# Patient Record
Sex: FEMALE | Race: BLACK OR AFRICAN AMERICAN
[De-identification: names, ages, dates, MRNs, and addresses within clinical notes are randomized per-mention and may not be internally consistent; named-entity substitution may affect disease eponyms.]

---

## 2017-02-15 ENCOUNTER — HOSPITAL ENCOUNTER (OUTPATIENT)
Dept: HOSPITAL 62 - OD | Age: 62
End: 2017-02-15
Payer: COMMERCIAL

## 2017-02-15 DIAGNOSIS — M79.7: ICD-10-CM

## 2017-02-15 DIAGNOSIS — I10: Primary | ICD-10-CM

## 2017-02-15 LAB
ALBUMIN SERPL-MCNC: 4.5 G/DL (ref 3.5–5)
ALP SERPL-CCNC: 98 U/L (ref 38–126)
ALT SERPL-CCNC: 18 U/L (ref 9–52)
ANION GAP SERPL CALC-SCNC: 13 MMOL/L (ref 5–19)
AST SERPL-CCNC: 18 U/L (ref 14–36)
BASOPHILS # BLD AUTO: 0 10^3/UL (ref 0–0.2)
BASOPHILS NFR BLD AUTO: 0.8 % (ref 0–2)
BILIRUB DIRECT SERPL-MCNC: 0 MG/DL (ref 0–0.3)
BILIRUB SERPL-MCNC: 0.8 MG/DL (ref 0.2–1.3)
BUN SERPL-MCNC: 14 MG/DL (ref 7–20)
CALCIUM: 9.5 MG/DL (ref 8.4–10.2)
CHLORIDE SERPL-SCNC: 106 MMOL/L (ref 98–107)
CHOLEST SERPL-MCNC: 149.58 MG/DL (ref 0–200)
CO2 SERPL-SCNC: 27 MMOL/L (ref 22–30)
CREAT SERPL-MCNC: 0.97 MG/DL (ref 0.52–1.25)
DIRECT HDL: 61 MG/DL (ref 40–?)
EOSINOPHIL # BLD AUTO: 0.3 10^3/UL (ref 0–0.6)
EOSINOPHIL NFR BLD AUTO: 5.9 % (ref 0–6)
ERYTHROCYTE [DISTWIDTH] IN BLOOD BY AUTOMATED COUNT: 17.4 % (ref 11.5–14)
ERYTHROCYTE [SEDIMENTATION RATE] IN BLOOD: 30 MM/HR (ref 0–30)
GLUCOSE SERPL-MCNC: 100 MG/DL (ref 75–110)
HCT VFR BLD CALC: 34 % (ref 36–47)
HGB BLD-MCNC: 12 G/DL (ref 12–15.5)
HGB HCT DIFFERENCE: 2
LDLC SERPL DIRECT ASSAY-MCNC: 33 MG/DL (ref ?–100)
LYMPHOCYTES # BLD AUTO: 1.3 10^3/UL (ref 0.5–4.7)
LYMPHOCYTES NFR BLD AUTO: 27.8 % (ref 13–45)
MCH RBC QN AUTO: 27.6 PG (ref 27–33.4)
MCHC RBC AUTO-ENTMCNC: 35.3 G/DL (ref 32–36)
MCV RBC AUTO: 78 FL (ref 80–97)
MONOCYTES # BLD AUTO: 0.3 10^3/UL (ref 0.1–1.4)
MONOCYTES NFR BLD AUTO: 6.7 % (ref 3–13)
NEUTROPHILS # BLD AUTO: 2.8 10^3/UL (ref 1.7–8.2)
NEUTS SEG NFR BLD AUTO: 58.8 % (ref 42–78)
POTASSIUM SERPL-SCNC: 4.1 MMOL/L (ref 3.6–5)
PROT SERPL-MCNC: 7.4 G/DL (ref 6.3–8.2)
RBC # BLD AUTO: 4.34 10^6/UL (ref 3.72–5.28)
SODIUM SERPL-SCNC: 145.8 MMOL/L (ref 137–145)
TRIGL SERPL-MCNC: 75 MG/DL (ref ?–150)
VLDLC SERPL CALC-MCNC: 15 MG/DL (ref 10–31)
WBC # BLD AUTO: 4.8 10^3/UL (ref 4–10.5)

## 2017-02-15 PROCEDURE — 80053 COMPREHEN METABOLIC PANEL: CPT

## 2017-02-15 PROCEDURE — 85652 RBC SED RATE AUTOMATED: CPT

## 2017-02-15 PROCEDURE — 86038 ANTINUCLEAR ANTIBODIES: CPT

## 2017-02-15 PROCEDURE — 86430 RHEUMATOID FACTOR TEST QUAL: CPT

## 2017-02-15 PROCEDURE — 80061 LIPID PANEL: CPT

## 2017-02-15 PROCEDURE — 84443 ASSAY THYROID STIM HORMONE: CPT

## 2017-02-15 PROCEDURE — 86200 CCP ANTIBODY: CPT

## 2017-02-15 PROCEDURE — 36415 COLL VENOUS BLD VENIPUNCTURE: CPT

## 2017-02-15 PROCEDURE — 85025 COMPLETE CBC W/AUTO DIFF WBC: CPT

## 2017-02-15 PROCEDURE — 83036 HEMOGLOBIN GLYCOSYLATED A1C: CPT

## 2017-02-17 LAB — CYCLIC CITRUL PEPTIDE IGG/A AB: 6 UNITS (ref 0–19)

## 2017-02-20 ENCOUNTER — HOSPITAL ENCOUNTER (EMERGENCY)
Dept: HOSPITAL 62 - ER | Age: 62
Discharge: HOME | End: 2017-02-20
Payer: COMMERCIAL

## 2017-02-20 VITALS — SYSTOLIC BLOOD PRESSURE: 148 MMHG | DIASTOLIC BLOOD PRESSURE: 85 MMHG

## 2017-02-20 DIAGNOSIS — R09.89: ICD-10-CM

## 2017-02-20 DIAGNOSIS — J40: Primary | ICD-10-CM

## 2017-02-20 DIAGNOSIS — R05: ICD-10-CM

## 2017-02-20 DIAGNOSIS — R06.02: ICD-10-CM

## 2017-02-20 LAB
ALBUMIN SERPL-MCNC: 4.3 G/DL (ref 3.5–5)
ALP SERPL-CCNC: 106 U/L (ref 38–126)
ALT SERPL-CCNC: 21 U/L (ref 9–52)
ANION GAP SERPL CALC-SCNC: 13 MMOL/L (ref 5–19)
APPEARANCE UR: CLEAR
AST SERPL-CCNC: 19 U/L (ref 14–36)
BASOPHILS # BLD AUTO: 0.1 10^3/UL (ref 0–0.2)
BASOPHILS NFR BLD AUTO: 1.1 % (ref 0–2)
BILIRUB DIRECT SERPL-MCNC: 0 MG/DL (ref 0–0.3)
BILIRUB SERPL-MCNC: 0.9 MG/DL (ref 0.2–1.3)
BILIRUB UR QL STRIP: NEGATIVE
BUN SERPL-MCNC: 14 MG/DL (ref 7–20)
CALCIUM: 10.1 MG/DL (ref 8.4–10.2)
CHLORIDE SERPL-SCNC: 102 MMOL/L (ref 98–107)
CK MB SERPL-MCNC: 0.37 NG/ML (ref ?–4.55)
CK SERPL-CCNC: 74 U/L (ref 30–135)
CO2 SERPL-SCNC: 31 MMOL/L (ref 22–30)
CREAT SERPL-MCNC: 1.11 MG/DL (ref 0.52–1.25)
EOSINOPHIL # BLD AUTO: 0.4 10^3/UL (ref 0–0.6)
EOSINOPHIL NFR BLD AUTO: 4.9 % (ref 0–6)
ERYTHROCYTE [DISTWIDTH] IN BLOOD BY AUTOMATED COUNT: 17.4 % (ref 11.5–14)
GLUCOSE SERPL-MCNC: 105 MG/DL (ref 75–110)
GLUCOSE UR STRIP-MCNC: NEGATIVE MG/DL
HCT VFR BLD CALC: 35.1 % (ref 36–47)
HGB BLD-MCNC: 12.2 G/DL (ref 12–15.5)
HGB HCT DIFFERENCE: 1.5
KETONES UR STRIP-MCNC: NEGATIVE MG/DL
LYMPHOCYTES # BLD AUTO: 1.3 10^3/UL (ref 0.5–4.7)
LYMPHOCYTES NFR BLD AUTO: 16.2 % (ref 13–45)
MCH RBC QN AUTO: 27.5 PG (ref 27–33.4)
MCHC RBC AUTO-ENTMCNC: 34.8 G/DL (ref 32–36)
MCV RBC AUTO: 79 FL (ref 80–97)
MONOCYTES # BLD AUTO: 0.6 10^3/UL (ref 0.1–1.4)
MONOCYTES NFR BLD AUTO: 7.9 % (ref 3–13)
NEUTROPHILS # BLD AUTO: 5.5 10^3/UL (ref 1.7–8.2)
NEUTS SEG NFR BLD AUTO: 69.9 % (ref 42–78)
NITRITE UR QL STRIP: NEGATIVE
PH UR STRIP: 5 [PH] (ref 5–9)
POTASSIUM SERPL-SCNC: 4.5 MMOL/L (ref 3.6–5)
PROT SERPL-MCNC: 7.8 G/DL (ref 6.3–8.2)
PROT UR STRIP-MCNC: NEGATIVE MG/DL
RBC # BLD AUTO: 4.44 10^6/UL (ref 3.72–5.28)
SODIUM SERPL-SCNC: 146 MMOL/L (ref 137–145)
SP GR UR STRIP: 1.01
TROPONIN I SERPL-MCNC: < 0.012 NG/ML
UROBILINOGEN UR-MCNC: NEGATIVE MG/DL (ref ?–2)
WBC # BLD AUTO: 7.9 10^3/UL (ref 4–10.5)

## 2017-02-20 PROCEDURE — 93005 ELECTROCARDIOGRAM TRACING: CPT

## 2017-02-20 PROCEDURE — 83880 ASSAY OF NATRIURETIC PEPTIDE: CPT

## 2017-02-20 PROCEDURE — 85025 COMPLETE CBC W/AUTO DIFF WBC: CPT

## 2017-02-20 PROCEDURE — 93010 ELECTROCARDIOGRAM REPORT: CPT

## 2017-02-20 PROCEDURE — 84484 ASSAY OF TROPONIN QUANT: CPT

## 2017-02-20 PROCEDURE — 36415 COLL VENOUS BLD VENIPUNCTURE: CPT

## 2017-02-20 PROCEDURE — 80053 COMPREHEN METABOLIC PANEL: CPT

## 2017-02-20 PROCEDURE — 99284 EMERGENCY DEPT VISIT MOD MDM: CPT

## 2017-02-20 PROCEDURE — 82553 CREATINE MB FRACTION: CPT

## 2017-02-20 PROCEDURE — 81001 URINALYSIS AUTO W/SCOPE: CPT

## 2017-02-20 PROCEDURE — 82550 ASSAY OF CK (CPK): CPT

## 2017-02-20 PROCEDURE — 71010: CPT

## 2017-02-20 NOTE — ER DOCUMENT REPORT
ED Respiratory Problem





- General


Mode of Arrival: Ambulatory


Information source: Patient


TRAVEL OUTSIDE OF THE [place] IN LAST 30 DAYS: No





- HPI


Patient complains to provider of: Cough


Onset: Other - Approximately 3 weeks ago


Duration: Worse/persistent


Cough: Productive


Sputum amount: Moderate


Sputum color: Green - With odor


Sputum consistency: Thick


Associated symptoms: denies: Fever





<SUDHAKAR WHALEN - Last Filed: 02/20/17 10:02>





<RAVEN VICTORIA - Last Filed: 02/20/17 10:16>





- General


Chief Complaint: Productive Cough


Stated Complaint: LABORED BREATHING,COUGH,CHEST CONGESTION


Notes: 


Patient is a 61-year-old female presenting to the emergency department chief 

complaint cough onset approximately 3 weeks ago.  Patient states that the cough 

became productive Friday, 02/17/2017, and last night the sputum became green 

with a bad odor.  Patient denies fever.  Patient states that the cough is 

exacerbated by laying flat, and she has chest pain secondary to her cough.  

Patient did receive the flu vaccination.  Patient's primary care provider is 

the [facility].


 (SUDHAKAR WHALEN)





- Related Data


Allergies/Adverse Reactions: 


 





No Known Allergies Allergy (Verified 02/20/17 04:38)


 











Past Medical History





- General


Information source: Patient





- Social History


Smoking Status: Never Smoker


Chew tobacco use (# tins/day): No


Frequency of alcohol use: None


Drug Abuse: None


Family History: Reviewed & Not Pertinent





- Past Medical History


Cardiac Medical History: Reports: Hx Hypertension


Renal/ Medical History: Denies: Hx Peritoneal Dialysis


Musculoskeltal Medical History: Reports Hx Arthritis


Past Surgical History: Reports: Hx Hysterectomy - Partial





- Immunizations


Hx Diphtheria, Pertussis, Tetanus Vaccination: Yes





<SUDHAKAR WHALEN - Last Filed: 02/20/17 10:02>





Review of Systems





- Review of Systems


Constitutional: No symptoms reported.  denies: Fever


EENT: No symptoms reported


Cardiovascular: See HPI, Chest pain - Secondary to cough


Respiratory: See HPI, Cough, Sputum


Gastrointestinal: No symptoms reported


Genitourinary: No symptoms reported


Female Genitourinary: No symptoms reported


Musculoskeletal: No symptoms reported


Skin: No symptoms reported


Hematologic/Lymphatic: No symptoms reported


Neurological/Psychological: No symptoms reported


-: Yes All other systems reviewed and negative





<SUDHAKAR WHALEN - Last Filed: 02/20/17 10:02>





Physical Exam





- Vital signs


Interpretation: Normal





- General


General appearance: Alert





- HEENT


Head: Normocephalic, Atraumatic


Eyes: Normal


Pupils: PERRL





- Respiratory


Respiratory status: No respiratory distress


Chest status: Nontender


Breath sounds: Productive cough





- Cardiovascular


Rhythm: Regular


Heart sounds: Normal auscultation


Murmur: No





- Abdominal


Inspection: Normal


Distension: No distension


Bowel sounds: Normal


Tenderness: Nontender


Organomegaly: No organomegaly





- Back


Back: Normal, Nontender





- Extremities


General upper extremity: Normal inspection, Nontender, Normal color, Normal 

temperature


General lower extremity: Normal inspection, Nontender, Normal color, Normal 

temperature





- Neurological


Neuro grossly intact: Yes


Cognition: Normal


[place] Coma Scale Eye Opening: Spontaneous


[place] Coma Scale Verbal: Oriented


[place] Coma Scale Motor: Obeys Commands


[place] Coma Scale Total: 15


Speech: Normal





- Psychological


Associated symptoms: Normal affect, Normal mood





- Skin


Skin Temperature: Warm


Skin Moisture: Dry


Skin Color: Normal





<SUDHAKAR WHALEN - Last Filed: 02/20/17 10:02>





Course





- Laboratory


Result Diagrams: 


 02/20/17 07:09





 02/20/17 07:09





<SUDHAKAR WHALEN - Last Filed: 02/20/17 10:02>





- Laboratory


Result Diagrams: 


 02/20/17 07:09





 02/20/17 07:09





<RAVEN VICTORIA - Last Filed: 02/20/17 10:16>





- Vital Signs


Vital signs: 


 











Temp Pulse Resp BP Pulse Ox


 


 97.8 F   90   20   142/80 H  96 


 


 02/20/17 04:40  02/20/17 04:40  02/20/17 04:40  02/20/17 04:40  02/20/17 04:40








 (SUDHAKAR WHALEN)


 (RAVEN VICTORIA)





- Laboratory


Laboratory results interpreted by me: 


 











  02/20/17 02/20/17





  07:09 07:09


 


Hct  35.1 L 


 


MCV  79 L 


 


RDW  17.4 H 


 


Sodium   146.0 H


 


Carbon Dioxide   31 H


 


Est GFR (Non-Af Amer)   50 L








 (SUDHAKAR WHALEN)


 (RAVEN VICTORIA)





Discharge





<SUDHAKAR WHALEN - Last Filed: 02/20/17 10:02>





<RAVEN VICTORIA - Last Filed: 02/20/17 10:16>





- Discharge


Clinical Impression: 


 Bronchitis





Condition: Stable


Disposition: HOME, SELF-CARE


Additional Instructions: 


Bronchitis





     You have acute bronchitis.  This disease is an infection or inflammation 

of the air passageways in your lungs.  Symptoms usually include cough, low 

grade fever, shortness of breath, and wheezing.  The cough usually persists for 

a couple of weeks.


     Most cases of bronchitis get better without antibiotics.  We prescribe 

antibiotics when we believe bacteria are damaging your airways, or if there's 

high risk the bronchitis will worsen into pneumonia.


     Increase your fluid intake. A cool mist humidifier may make your lungs 

more comfortable.  An expectorant (cough medicine that loosens phlegm) can 

help.  If you smoke, STOP!!!  Recovery from bronchitis can be somewhat slow, 

but you should see improvement within a day or two.


     Repeated episodes of bronchitis may result in lung damage -- for example, 

chronic bronchitis, recurrent pneumonias, or emphysema.


     Call the doctor if you develop increasing fever, shortness of breath, 

chest pain, bloody sputum, or otherwise worsen.  If you have not improved at 

all after several days, contact the physician.











START THE MEDICATION AS PRESCRIBED TOMORROW.


DRINK PLENTY OF FLUIDS.


GET PLENTY OF REST.


TRY ROBITUSSIN-DM FOR COUGH.


FOLLOW UP WITH A LOCAL MEDICAL DOCTOR IF NOT IMPROVING.





RETURN TO THE EMERGENCY ROOM IF ANY NEW OR WORSENING SYMPTOMS.








Forms:  Return to Work


Scribe Attestation: 





02/20/17 09:59


I personally performed the services described in the documentation, reviewed 

and edited the documentation which was dictated to the scribe in my presence, 

and it accurately records my words and actions. (RAVEN VICTORIA)





Scribe Documentation





- Scribe


Written by Dharmesh:: Sudhakar Whalen 02/20/2017 0955


acting as scribe for :: Arnoldo





<SUDHAKAR WHALEN - Last Filed: 02/20/17 10:02>

## 2017-02-20 NOTE — EKG REPORT
SEVERITY:- BORDERLINE ECG -

SINUS RHYTHM

PROBABLE LEFT ATRIAL ABNORMALITY

:

Confirmed by: Herb Otto 20-Feb-2017 20:01:48

## 2017-06-21 ENCOUNTER — HOSPITAL ENCOUNTER (OUTPATIENT)
Dept: HOSPITAL 62 - RT | Age: 62
End: 2017-06-21
Payer: COMMERCIAL

## 2017-06-21 DIAGNOSIS — R06.2: Primary | ICD-10-CM

## 2017-06-21 PROCEDURE — 94060 EVALUATION OF WHEEZING: CPT

## 2017-06-21 NOTE — PULMONARY FUNCTION TEST
Pulmonary Function Test


Date of Procedure:: 06/21/17


INDICATION:: Dyspnea


Referring Provider: Dr. Emerita Vasquez


Technician: Indira Rivas RRT, RCP





- Report


Spirometry: 


FVC 2.92 L 87%    postbronchodilator therapy 2.99 L 89%





FEV1 2.31 L 86%    postbronchodilator therapy 2.38 L 89%





FEV1/FVC % 79    postbronchodilator therapy 79    predicted 82





FEF 25-75% 2.47 88%    postbronchodilator therapy 2.46 88%


Impression: 


No evidence for obstructive ventilatory defect.

## 2017-09-11 ENCOUNTER — HOSPITAL ENCOUNTER (EMERGENCY)
Dept: HOSPITAL 62 - ER | Age: 62
Discharge: HOME | End: 2017-09-11
Payer: SELF-PAY

## 2017-09-11 VITALS — SYSTOLIC BLOOD PRESSURE: 129 MMHG | DIASTOLIC BLOOD PRESSURE: 81 MMHG

## 2017-09-11 DIAGNOSIS — R09.89: ICD-10-CM

## 2017-09-11 DIAGNOSIS — R07.9: ICD-10-CM

## 2017-09-11 DIAGNOSIS — R05: Primary | ICD-10-CM

## 2017-09-11 PROCEDURE — 93010 ELECTROCARDIOGRAM REPORT: CPT

## 2017-09-11 PROCEDURE — 84484 ASSAY OF TROPONIN QUANT: CPT

## 2017-09-11 PROCEDURE — 99284 EMERGENCY DEPT VISIT MOD MDM: CPT

## 2017-09-11 PROCEDURE — 36415 COLL VENOUS BLD VENIPUNCTURE: CPT

## 2017-09-11 PROCEDURE — 93005 ELECTROCARDIOGRAM TRACING: CPT

## 2017-09-11 PROCEDURE — 71010: CPT

## 2017-09-11 NOTE — ER DOCUMENT REPORT
ED Respiratory Problem





- General


Mode of Arrival: Ambulatory


Information source: Patient


TRAVEL OUTSIDE OF THE [place] IN LAST 30 DAYS: No





<DHARA HEARD - Last Filed: 09/11/17 23:42>





- General


TRAVEL OUTSIDE OF THE [place] IN LAST 30 DAYS: No





<ANAMIKA SILVA - Last Filed: 09/12/17 00:28>





- General


Chief Complaint: Chest Congestion


Stated Complaint: CONGESTION/CHEST PAIN


Time Seen by Provider: 09/11/17 20:13


Notes: 





Patient is a 62-year-old female who presents to the emergency department today 

with complaints of a cough.  According to daughter at bedside, the patient 

always has a cough.  Patient states she has "chronic bronchitis".  Patient 

states she has a burning pain in her left lung.  Patient states she feels that 

she is wheezing and is having voice changes. (DHARA HEARD)





- Related Data


Allergies/Adverse Reactions: 


 





No Known Allergies Allergy (Verified 09/11/17 19:17)


 











Past Medical History





- General


Information source: Patient





- Social History


Smoking Status: Unknown if Ever Smoked


Drug Abuse: None


Family History: Reviewed & Not Pertinent


Patient has suicidal ideation: No


Patient has homicidal ideation: No





- Past Medical History


Cardiac Medical History: Reports: Hx Hypertension


Musculoskeltal Medical History: Reports Hx Arthritis


Past Surgical History: Reports: Hx Hysterectomy





<DHARA HEARD - Last Filed: 09/11/17 23:42>





- Social History


Smoking Status: Unknown if Ever Smoked


Drug Abuse: None


Family History: Reviewed & Not Pertinent


Patient has suicidal ideation: No


Patient has homicidal ideation: No





- Past Medical History


Cardiac Medical History: Reports: Hx Hypertension


Renal/ Medical History: Denies: Hx Peritoneal Dialysis


Musculoskeltal Medical History: Reports Hx Arthritis


Past Surgical History: Reports: Hx Hysterectomy





- Immunizations


Hx Diphtheria, Pertussis, Tetanus Vaccination: Yes





<ANAMIKA SILVA - Last Filed: 09/12/17 00:28>





Review of Systems





- Review of Systems


Constitutional: No symptoms reported


EENT: No symptoms reported


Cardiovascular: No symptoms reported


Respiratory: See HPI, Cough


Gastrointestinal: No symptoms reported


Genitourinary: No symptoms reported


Female Genitourinary: No symptoms reported


Musculoskeletal: No symptoms reported


Skin: No symptoms reported


Hematologic/Lymphatic: No symptoms reported


Neurological/Psychological: No symptoms reported


-: Yes All other systems reviewed and negative





<DHARA HEARD - Last Filed: 09/11/17 23:42>





Physical Exam





<DHARA HEARD - Last Filed: 09/11/17 23:42>





<ANAMIKA SILVA - Last Filed: 09/12/17 00:28>





- Vital signs


Vitals: 


 











Temp Pulse Resp BP Pulse Ox


 


 98.7 F   75   20   148/79 H  97 


 


 09/11/17 19:17  09/11/17 19:17  09/11/17 19:17  09/11/17 19:17  09/11/17 19:17














- Notes


Notes: 





Physical Exam:


 


General: Alert, appears well. 


 


HEENT: Normocephalic. Atraumatic. PERRL. Extraocular movements intact. 

Oropharynx clear. Nasal congestion. Voice consistent with laryngitis. 


  


Neck: Supple. Non-tender.


 


Respiratory: No respiratory distress. Clear and equal breath sounds bilaterally.


 


Cardiovascular: Regular rate and rhythm. 


 


Abdominal: Normal Inspection. Non-tender. No distension. Normal Bowel Sounds. 


 


Back: Non-tender. No deformity or step off.


 


Extremities: Moves all four extremities.


Upper extremities: Normal inspection. Normal ROM.  


Lower extremities: Normal inspection. No edema. Normal ROM.


 


Neurological: Normal cognition. AAOx4. Normal speech.  


 


Psychological: Normal affect. Normal Mood. 


 


Skin: Warm. Dry. Normal color. (DHARA HEARD)





Course





<DHARA HEARD - Last Filed: 09/11/17 23:42>





- Diagnostic Test


Radiology reviewed: Reports reviewed





<ANAMIKA SILVA - Last Filed: 09/12/17 00:28>





- Re-evaluation


Re-evalutation: 





09/11


Patient with upper respiratory infection that is unlikely bacterial, however 

patient would like antibiotics.  Patient will also been given steroids as her 

daughter states that this is helped in the past.  Vitals are stable.  Afebrile.

  No difficulty breathing.  Stable for discharge.  Follow-up with PMD. (ANAMIKA SILVA)





- Vital Signs


Vital signs: 


 











Temp Pulse Resp BP Pulse Ox


 


 98.7 F   75   20   129/81 H  97 


 


 09/11/17 19:17  09/11/17 19:17  09/11/17 19:17  09/11/17 22:45  09/11/17 22:46














Discharge





<DHARA HEARD - Last Filed: 09/11/17 23:42>





<ANAMIKA SILVA - Last Filed: 09/12/17 00:28>





- Discharge


Clinical Impression: 


 Cough





Condition: Stable


Disposition: HOME, SELF-CARE


Instructions:  Bronchitis ([facility])


Additional Instructions: 


Please follow-up with your doctor this week.


Prescriptions: 


Azithromycin [Zithromax 250 mg Tablet] 250 mg PO ASDIR PRN #6 tablet


 PRN Reason: 


Prednisone 40 mg PO DAILY #6 tablet


Referrals: 


[facility] [Primary Care Provider] - Follow up as needed


Scribe Attestation: 





09/12/17 00:28


I personally performed the services described in the documentation, reviewed 

and edited the documentation which was dictated to the scribe in my presence, 

and it accurately records my words and actions. (ANAMIKA SILVA)





Scribe Documentation





- Scribe


Written by Dharmesh:: Dharmesh Carrillo,  9/11/2017 2354


acting as scribe for :: Marty





<DHARA HEARD - Last Filed: 09/11/17 23:42>

## 2017-09-11 NOTE — RADIOLOGY REPORT (SQ)
EXAM DESCRIPTION:  CHEST SINGLE VIEW



COMPLETED DATE/TIME:  9/11/2017 8:56 pm



REASON FOR STUDY:  congestion



COMPARISON:  2/20/2017



EXAM PARAMETERS:  NUMBER OF VIEWS: One view.

TECHNIQUE: Single frontal radiographic view of the chest acquired.

RADIATION DOSE: NA

LIMITATIONS: None.



FINDINGS:  LUNGS AND PLEURA: No opacities, masses or pneumothorax. No pleural effusion.

MEDIASTINUM AND HILAR STRUCTURES: No masses.  Contour normal.

HEART AND VASCULAR STRUCTURES: Heart normal in size.  Normal vasculature.

BONES: No acute findings.

HARDWARE: None in the chest.

OTHER: No other significant finding.



IMPRESSION:  NO ACUTE RADIOGRAPHIC FINDING IN THE CHEST.



TECHNICAL DOCUMENTATION:  JOB ID:  [id number]

## 2017-11-17 ENCOUNTER — HOSPITAL ENCOUNTER (OUTPATIENT)
Dept: HOSPITAL 62 - CCC | Age: 62
End: 2017-11-17
Payer: COMMERCIAL

## 2017-11-17 DIAGNOSIS — M79.662: ICD-10-CM

## 2017-11-17 DIAGNOSIS — M79.661: Primary | ICD-10-CM

## 2017-11-17 PROCEDURE — 93970 EXTREMITY STUDY: CPT

## 2017-11-17 NOTE — RADIOLOGY REPORT (SQ)
EXAM DESCRIPTION:  VENOUS BILATERAL LOWER



COMPLETED DATE/TIME:  11/17/2017 11:19 am



REASON FOR STUDY:  PAIN M79.661  PAIN IN RIGHT LOWER LEG M79.662  PAIN IN LEFT LOWER LEG



COMPARISON:  None.



TECHNIQUE:  Dynamic and static gray scale and color images acquired of both lower extremity venous sy
stems. Selected spectral images acquired with additional compression and augmentation maneuvers. Imag
es stored on PACS.



LIMITATIONS:  None.



FINDINGS:  RIGHT LEG

COMMON FEMORAL AND FEMORAL: Normal phasicity, compression and augmentation. No visualized echogenic m
aterial on gray scale. No defects on color images.

POPLITEAL: Normal compression and augmentation. No visualized echogenic material on gray scale. No de
fects on color images.

CALF VESSELS: Normal compression and augmentation. No visualized echogenic material on gray scale. No
 defects on color image.

GSV AND SSV: Normal compression. No visualized echogenic material on gray scale. No defects on color 
images.

ANY DEEP VENOUS INSUFFICIENCY: Not evaluated.

ANY EVIDENCE OF POPLITEAL CYST: No.

OTHER: No other significant finding.

LEFT LEG

COMMON FEMORAL AND FEMORAL: Normal phasicity, compression and augmentation. No visualized echogenic m
aterial on gray scale. No defects on color images.

POPLITEAL: Normal compression and augmentation. No visualized echogenic material on gray scale. No de
fects on color images.

CALF VESSELS: Normal compression and augmentation. No visualized echogenic material on gray scale. No
 defects on color images.

GSV AND SSV: Normal compression. No visualized echogenic material on gray scale. No defects on color 
images.

ANY DEEP VENOUS INSUFFICIENCY: Not evaluated.

ANY EVIDENCE POPLITEAL CYST: No.

OTHER: No other significant finding.



IMPRESSION:  NO EVIDENCE DVT OR SVT IN EITHER LEG.



TECHNICAL DOCUMENTATION:  JOB ID:  [id number]

 2011 [employer]- All Rights Reserved

## 2018-02-06 ENCOUNTER — HOSPITAL ENCOUNTER (OUTPATIENT)
Dept: HOSPITAL 62 - OD | Age: 63
End: 2018-02-06
Attending: FAMILY MEDICINE
Payer: COMMERCIAL

## 2018-02-06 DIAGNOSIS — Z79.899: ICD-10-CM

## 2018-02-06 DIAGNOSIS — M17.0: ICD-10-CM

## 2018-02-06 DIAGNOSIS — I10: Primary | ICD-10-CM

## 2018-02-06 DIAGNOSIS — D64.9: ICD-10-CM

## 2018-02-06 LAB
ADD MANUAL DIFF: NO
ALBUMIN SERPL-MCNC: 4.6 G/DL (ref 3.5–5)
ALP SERPL-CCNC: 89 U/L (ref 38–126)
ALT SERPL-CCNC: 25 U/L (ref 9–52)
ANION GAP SERPL CALC-SCNC: 10 MMOL/L (ref 5–19)
AST SERPL-CCNC: 27 U/L (ref 14–36)
BASOPHILS # BLD AUTO: 0.1 10^3/UL (ref 0–0.2)
BASOPHILS NFR BLD AUTO: 1.2 % (ref 0–2)
BILIRUB DIRECT SERPL-MCNC: 0.4 MG/DL (ref 0–0.4)
BILIRUB SERPL-MCNC: 1.1 MG/DL (ref 0.2–1.3)
BUN SERPL-MCNC: 13 MG/DL (ref 7–20)
CALCIUM: 10.1 MG/DL (ref 8.4–10.2)
CHLORIDE SERPL-SCNC: 104 MMOL/L (ref 98–107)
CHOLEST SERPL-MCNC: 145.05 MG/DL (ref 0–200)
CO2 SERPL-SCNC: 31 MMOL/L (ref 22–30)
EOSINOPHIL # BLD AUTO: 0.3 10^3/UL (ref 0–0.6)
EOSINOPHIL NFR BLD AUTO: 6.7 % (ref 0–6)
ERYTHROCYTE [DISTWIDTH] IN BLOOD BY AUTOMATED COUNT: 16.8 % (ref 11.5–14)
FOLATE SERPL-MCNC: 17 NG/ML (ref 2.76–?)
GLUCOSE SERPL-MCNC: 111 MG/DL (ref 75–110)
HCT VFR BLD CALC: 34.8 % (ref 36–47)
HGB BLD-MCNC: 12.1 G/DL (ref 12–15.5)
LDLC SERPL DIRECT ASSAY-MCNC: 38 MG/DL (ref ?–100)
LYMPHOCYTES # BLD AUTO: 1.2 10^3/UL (ref 0.5–4.7)
LYMPHOCYTES NFR BLD AUTO: 26.7 % (ref 13–45)
MCH RBC QN AUTO: 28.2 PG (ref 27–33.4)
MCHC RBC AUTO-ENTMCNC: 34.8 G/DL (ref 32–36)
MCV RBC AUTO: 81 FL (ref 80–97)
MONOCYTES # BLD AUTO: 0.4 10^3/UL (ref 0.1–1.4)
MONOCYTES NFR BLD AUTO: 9.8 % (ref 3–13)
NEUTROPHILS # BLD AUTO: 2.5 10^3/UL (ref 1.7–8.2)
NEUTS SEG NFR BLD AUTO: 55.6 % (ref 42–78)
PLATELET # BLD: 245 10^3/UL (ref 150–450)
POTASSIUM SERPL-SCNC: 4.4 MMOL/L (ref 3.6–5)
PROT SERPL-MCNC: 7.7 G/DL (ref 6.3–8.2)
RBC # BLD AUTO: 4.31 10^6/UL (ref 3.72–5.28)
SODIUM SERPL-SCNC: 144.6 MMOL/L (ref 137–145)
TOTAL CELLS COUNTED % (AUTO): 100 %
TRIGL SERPL-MCNC: 96 MG/DL (ref ?–150)
VIT B12 SERPL-MCNC: 255 PG/ML (ref 239–931)
VLDLC SERPL CALC-MCNC: 19 MG/DL (ref 10–31)
WBC # BLD AUTO: 4.4 10^3/UL (ref 4–10.5)

## 2018-02-06 PROCEDURE — 82607 VITAMIN B-12: CPT

## 2018-02-06 PROCEDURE — 36415 COLL VENOUS BLD VENIPUNCTURE: CPT

## 2018-02-06 PROCEDURE — 82746 ASSAY OF FOLIC ACID SERUM: CPT

## 2018-02-06 PROCEDURE — 80061 LIPID PANEL: CPT

## 2018-02-06 PROCEDURE — 85025 COMPLETE CBC W/AUTO DIFF WBC: CPT

## 2018-02-06 PROCEDURE — 84443 ASSAY THYROID STIM HORMONE: CPT

## 2018-02-06 PROCEDURE — 80053 COMPREHEN METABOLIC PANEL: CPT

## 2018-02-13 ENCOUNTER — HOSPITAL ENCOUNTER (OUTPATIENT)
Dept: HOSPITAL 62 - OD | Age: 63
End: 2018-02-13
Attending: FAMILY MEDICINE
Payer: COMMERCIAL

## 2018-02-13 DIAGNOSIS — Z79.899: ICD-10-CM

## 2018-02-13 DIAGNOSIS — R73.9: Primary | ICD-10-CM

## 2018-02-13 PROCEDURE — 82947 ASSAY GLUCOSE BLOOD QUANT: CPT

## 2018-02-13 PROCEDURE — 83036 HEMOGLOBIN GLYCOSYLATED A1C: CPT

## 2018-02-13 PROCEDURE — 36415 COLL VENOUS BLD VENIPUNCTURE: CPT

## 2018-02-13 PROCEDURE — 82950 GLUCOSE TEST: CPT

## 2018-04-12 ENCOUNTER — HOSPITAL ENCOUNTER (OUTPATIENT)
Dept: HOSPITAL 62 - OD | Age: 63
End: 2018-04-12
Attending: FAMILY MEDICINE
Payer: COMMERCIAL

## 2018-04-12 DIAGNOSIS — J44.9: Primary | ICD-10-CM

## 2018-04-12 DIAGNOSIS — R05: ICD-10-CM

## 2018-04-12 PROCEDURE — 71046 X-RAY EXAM CHEST 2 VIEWS: CPT

## 2018-04-12 NOTE — RADIOLOGY REPORT (SQ)
EXAM DESCRIPTION:  CHEST PA/LATERAL



COMPLETED DATE/TIME:  4/12/2018 10:42 am



REASON FOR STUDY:  COUGH,COPD



COMPARISON:  September 2017



EXAM PARAMETERS:  NUMBER OF VIEWS: two views

TECHNIQUE: Digital Frontal and Lateral radiographic views of the chest acquired.

RADIATION DOSE: NA

LIMITATIONS: none



FINDINGS:  LUNGS AND PLEURA: No opacities, masses or pneumothorax. No pleural effusion.

MEDIASTINUM AND HILAR STRUCTURES: No masses or contour abnormalities.

HEART AND VASCULAR STRUCTURES: Heart normal size.  No evidence for failure.

BONES: No acute findings.

HARDWARE: None in the chest.

OTHER: No other significant finding.



IMPRESSION:  NO SIGNIFICANT RADIOGRAPHIC FINDING IN THE CHEST.



TECHNICAL DOCUMENTATION:  JOB ID:  [id number]

 2011 [employer]- All Rights Reserved



Reading location - IP/workstation name: [email or web address]

## 2018-05-20 ENCOUNTER — HOSPITAL ENCOUNTER (OUTPATIENT)
Dept: HOSPITAL 62 - ER | Age: 63
Setting detail: OBSERVATION
LOS: 2 days | Discharge: HOME | End: 2018-05-22
Attending: SURGERY | Admitting: SURGERY
Payer: COMMERCIAL

## 2018-05-20 DIAGNOSIS — I10: ICD-10-CM

## 2018-05-20 DIAGNOSIS — R06.82: ICD-10-CM

## 2018-05-20 DIAGNOSIS — K80.12: Primary | ICD-10-CM

## 2018-05-20 DIAGNOSIS — Z90.710: ICD-10-CM

## 2018-05-20 DIAGNOSIS — M19.90: ICD-10-CM

## 2018-05-20 DIAGNOSIS — R06.4: ICD-10-CM

## 2018-05-20 LAB
ADD MANUAL DIFF: NO
ALBUMIN SERPL-MCNC: 5.1 G/DL (ref 3.5–5)
ALP SERPL-CCNC: 109 U/L (ref 38–126)
ALT SERPL-CCNC: 22 U/L (ref 9–52)
ANION GAP SERPL CALC-SCNC: 21 MMOL/L (ref 5–19)
APPEARANCE UR: CLEAR
APTT PPP: YELLOW S
AST SERPL-CCNC: 25 U/L (ref 14–36)
BASOPHILS # BLD AUTO: 0 10^3/UL (ref 0–0.2)
BASOPHILS NFR BLD AUTO: 0.4 % (ref 0–2)
BILIRUB DIRECT SERPL-MCNC: 0.4 MG/DL (ref 0–0.4)
BILIRUB SERPL-MCNC: 0.8 MG/DL (ref 0.2–1.3)
BILIRUB UR QL STRIP: NEGATIVE
BUN SERPL-MCNC: 14 MG/DL (ref 7–20)
CALCIUM: 10.6 MG/DL (ref 8.4–10.2)
CHLORIDE SERPL-SCNC: 99 MMOL/L (ref 98–107)
CK SERPL-CCNC: 63 U/L (ref 30–135)
CO2 SERPL-SCNC: 24 MMOL/L (ref 22–30)
EOSINOPHIL # BLD AUTO: 0 10^3/UL (ref 0–0.6)
EOSINOPHIL NFR BLD AUTO: 0.1 % (ref 0–6)
ERYTHROCYTE [DISTWIDTH] IN BLOOD BY AUTOMATED COUNT: 16.7 % (ref 11.5–14)
GLUCOSE SERPL-MCNC: 182 MG/DL (ref 75–110)
GLUCOSE UR STRIP-MCNC: 50 MG/DL
HCT VFR BLD CALC: 37.1 % (ref 36–47)
HGB BLD-MCNC: 12.8 G/DL (ref 12–15.5)
KETONES UR STRIP-MCNC: 20 MG/DL
LYMPHOCYTES # BLD AUTO: 1 10^3/UL (ref 0.5–4.7)
LYMPHOCYTES NFR BLD AUTO: 11.9 % (ref 13–45)
MCH RBC QN AUTO: 28.1 PG (ref 27–33.4)
MCHC RBC AUTO-ENTMCNC: 34.6 G/DL (ref 32–36)
MCV RBC AUTO: 81 FL (ref 80–97)
MONOCYTES # BLD AUTO: 0.2 10^3/UL (ref 0.1–1.4)
MONOCYTES NFR BLD AUTO: 2.7 % (ref 3–13)
NEUTROPHILS # BLD AUTO: 7.2 10^3/UL (ref 1.7–8.2)
NEUTS SEG NFR BLD AUTO: 84.9 % (ref 42–78)
NITRITE UR QL STRIP: NEGATIVE
PH UR STRIP: 7 [PH] (ref 5–9)
PLATELET # BLD: 276 10^3/UL (ref 150–450)
POTASSIUM SERPL-SCNC: 4 MMOL/L (ref 3.6–5)
PROT SERPL-MCNC: 8.4 G/DL (ref 6.3–8.2)
PROT UR STRIP-MCNC: 30 MG/DL
RBC # BLD AUTO: 4.57 10^6/UL (ref 3.72–5.28)
SODIUM SERPL-SCNC: 144.4 MMOL/L (ref 137–145)
SP GR UR STRIP: 1.02
TOTAL CELLS COUNTED % (AUTO): 100 %
UROBILINOGEN UR-MCNC: NEGATIVE MG/DL (ref ?–2)
WBC # BLD AUTO: 8.5 10^3/UL (ref 4–10.5)

## 2018-05-20 PROCEDURE — S0028 INJECTION, FAMOTIDINE, 20 MG: HCPCS

## 2018-05-20 PROCEDURE — 82550 ASSAY OF CK (CPK): CPT

## 2018-05-20 PROCEDURE — 93010 ELECTROCARDIOGRAM REPORT: CPT

## 2018-05-20 PROCEDURE — 86901 BLOOD TYPING SEROLOGIC RH(D): CPT

## 2018-05-20 PROCEDURE — 76705 ECHO EXAM OF ABDOMEN: CPT

## 2018-05-20 PROCEDURE — 85027 COMPLETE CBC AUTOMATED: CPT

## 2018-05-20 PROCEDURE — 99285 EMERGENCY DEPT VISIT HI MDM: CPT

## 2018-05-20 PROCEDURE — 86850 RBC ANTIBODY SCREEN: CPT

## 2018-05-20 PROCEDURE — 81001 URINALYSIS AUTO W/SCOPE: CPT

## 2018-05-20 PROCEDURE — 96375 TX/PRO/DX INJ NEW DRUG ADDON: CPT

## 2018-05-20 PROCEDURE — 80053 COMPREHEN METABOLIC PANEL: CPT

## 2018-05-20 PROCEDURE — 88304 TISSUE EXAM BY PATHOLOGIST: CPT

## 2018-05-20 PROCEDURE — 47562 LAPAROSCOPIC CHOLECYSTECTOMY: CPT

## 2018-05-20 PROCEDURE — 85025 COMPLETE CBC W/AUTO DIFF WBC: CPT

## 2018-05-20 PROCEDURE — 93005 ELECTROCARDIOGRAM TRACING: CPT

## 2018-05-20 PROCEDURE — 96374 THER/PROPH/DIAG INJ IV PUSH: CPT

## 2018-05-20 PROCEDURE — 84484 ASSAY OF TROPONIN QUANT: CPT

## 2018-05-20 PROCEDURE — 86900 BLOOD TYPING SEROLOGIC ABO: CPT

## 2018-05-20 PROCEDURE — 36415 COLL VENOUS BLD VENIPUNCTURE: CPT

## 2018-05-20 PROCEDURE — G0378 HOSPITAL OBSERVATION PER HR: HCPCS

## 2018-05-20 RX ADMIN — PROMETHAZINE HYDROCHLORIDE PRN MG: 25 INJECTION INTRAMUSCULAR; INTRAVENOUS at 16:16

## 2018-05-20 RX ADMIN — FAMOTIDINE SCH MG: 10 INJECTION INTRAVENOUS at 21:57

## 2018-05-20 NOTE — RADIOLOGY REPORT (SQ)
EXAM DESCRIPTION:  U/S ABDOMEN LIMITED W/O DOP



COMPLETED DATE/TIME:  5/20/2018 7:57 am



REASON FOR STUDY:  RUQ and epigastric abd pain with nausea   vomiting



COMPARISON:  None.



TECHNIQUE:  Dynamic and static grayscale images acquired of the abdomen and recorded on PACS. Dionneo
marleny selected color Doppler and spectral images recorded.



LIMITATIONS:  None.



FINDINGS:  PANCREAS: No masses. No peripancreatic edema or fluid collections.

LIVER: Echotexture is coarse with increased echogenicity consistent with fatty infiltration.

LIVER VASCULATURE: Normal directional flow of the main portal vein and hepatic veins.

GALLBLADDER: Gallstone(s). No pericholecystic fluid. No wall thickening.

ULTRASOUND-DETECTED SNYDER'S SIGN: Negative.

INTRAHEPATIC DUCTS AND COMMON DUCT: CBD and intrahepatic ducts normal caliber. No filling defects.

INFERIOR VENA CAVA: Normal flow.

AORTA: No aneurysm.

RIGHT KIDNEY:  Normal size. Normal echogenicity. No solid or suspicious masses. No hydronephrosis. No
 calcifications.

PERITONEAL AND RIGHT PLEURAL SPACE: No ascites or effusions.

OTHER: No other significant finding.



IMPRESSION:  GALLSTONES.  FATTY INFILTRATION OF THE LIVER.  OTHERWISE NORMAL RIGHT UPPER QUADRANT ULT
RASOUND.



TECHNICAL DOCUMENTATION:  JOB ID:  [id number]

 2011 [employer]- All Rights Reserved



Reading location - IP/workstation name: [email or web address]

## 2018-05-20 NOTE — ER DOCUMENT REPORT
ED General





<RAVEN VICTORIA - Last Filed: 05/20/18 08:34>





- General


Mode of Arrival: Ambulatory


Information source: Patient


TRAVEL OUTSIDE OF THE [place] IN LAST 30 DAYS: No





<DHARA HEARD - Last Filed: 05/20/18 08:54>





- General


Chief Complaint: Chest Pain


Stated Complaint: CHEST PAIN


Time Seen by Provider: 05/20/18 07:00


Notes: 





Patient is a 63 year old female that presents to the emergency department today 

with complaints of upper abdominal pain with associated vomiting. Patient 

states her pain has been consistent since onset and did not change with the 

vomiting. Patient mentions that she has been seen here before for these 

symptoms but upon review of records it does not appear that this is correct. 

Daughter states "Mom this is [place], [place]" which suggests she has been seen 

elsewhere for this.  (DHARA HEARD)





- Related Data


Allergies/Adverse Reactions: 


 





No Known Allergies Allergy (Verified 09/11/17 19:17)


 











Past Medical History





- General


Information source: Patient





- Social History


Smoking Status: Never Smoker


Cigarette use (# per day): No


Frequency of alcohol use: None


Drug Abuse: None


Lives with: Family


Family History: Reviewed & Not Pertinent





- Past Medical History


Cardiac Medical History: Reports: Hx Hypertension


Renal/ Medical History: Denies: Hx Peritoneal Dialysis


Musculoskeltal Medical History: Reports Hx Arthritis


Past Surgical History: Reports: Hx Hysterectomy





- Immunizations


Hx Diphtheria, Pertussis, Tetanus Vaccination: Yes





<DHARA HEARD - Last Filed: 05/20/18 08:54>





Review of Systems





- Review of Systems


Constitutional: No symptoms reported


EENT: No symptoms reported


Cardiovascular: No symptoms reported


Respiratory: No symptoms reported


Gastrointestinal: See HPI, Abdominal pain, Vomiting


Genitourinary: No symptoms reported


Female Genitourinary: No symptoms reported


Musculoskeletal: No symptoms reported


Skin: No symptoms reported


Hematologic/Lymphatic: No symptoms reported


Neurological/Psychological: No symptoms reported


-: Yes All other systems reviewed and negative





<DHARA HEARD - Last Filed: 05/20/18 08:54>





Physical Exam





<RAVEN VICTORIA - Last Filed: 05/20/18 08:34>





<DHARA HEARD - Last Filed: 05/20/18 08:54>





- Vital signs


Vitals: 


 











Temp Pulse Resp BP Pulse Ox


 


 98.8 F   116 H  22 H  145/93 H  100 


 


 05/20/18 06:47  05/20/18 06:47  05/20/18 06:47  05/20/18 06:47  05/20/18 06:47














- Notes


Notes: 





Physical Exam:


 


General: Alert, hyperventilating, appears uncomfortable. 


 


HEENT: Normocephalic. Atraumatic. PERRL. Extraocular movements intact. 

Oropharynx clear.


 


Neck: Supple. Non-tender.


 


Respiratory: No respiratory distress. Clear and equal breath sounds 

bilaterally. Tachypneic. No chest wall or sternal tenderness with palpation.


 


Cardiovascular: Regular rate and rhythm. 


 


Abdominal: RUQ and epigastric tenderness with palpation. No distension. Normal 

Bowel Sounds. 


 


Back: Non-tender. No deformity or step off.


 


Extremities: Moves all four extremities.


Upper extremities: Normal inspection. Normal ROM.  


Lower extremities: Normal inspection. No edema. Normal ROM.


 


Neurological: Normal cognition. AAOx4. Normal speech.  


 


Psychological: Normal affect. Normal Mood. 


 


Skin: Warm. Dry. Normal color. (DHARA HEARD)





Course





- Laboratory


Result Diagrams: 


 05/20/18 07:15





 05/20/18 07:15





- Diagnostic Test


Radiology reviewed: Image reviewed, Reports reviewed - Multiple small 

gallstones.  Radiologist report is no gallbladder wall thickening, the techs 

worksheet reports gallbladder wall thickening and positive Mckeon sign.





- EKG Interpretation by Me


EKG shows normal: Sinus rhythm, Axis, Intervals, QRS Complexes.  abnormal: ST-T 

Waves -  Inferior to T abnormalities


Rate: Tachycardia - 113


When compared to previous EKG there are: No significant change





- Consults


  ** Dr. Vasquez


Time consulted: 08:35


Consulted provider: will come to ER





<RAVEN VICTORIA - Last Filed: 05/20/18 08:34>





- Laboratory


Result Diagrams: 


 05/20/18 07:15





 05/20/18 07:15





<DHARA HEARD - Last Filed: 05/20/18 08:54>





- Vital Signs


Vital signs: 


 











Temp Pulse Resp BP Pulse Ox


 


 98.8 F   116 H  22 H  145/93 H  100 


 


 05/20/18 06:47  05/20/18 06:47  05/20/18 06:47  05/20/18 06:47  05/20/18 06:47














- Laboratory


Laboratory results interpreted by me: 


 











  05/20/18 05/20/18





  07:15 07:15


 


RDW  16.7 H 


 


Seg Neutrophils %  84.9 H 


 


Lymphocytes %  11.9 L 


 


Monocytes %  2.7 L 


 


Anion Gap   21 H


 


Glucose   182 H


 


Calcium   10.6 H


 


Total Protein   8.4 H


 


Albumin   5.1 H














Discharge





- Discharge


Admitting Provider: Surgicalist


Unit Admitted: Surgical Floor





<RAVEN VICTORIA - Last Filed: 05/20/18 08:34>





<DHARA HEARD - Last Filed: 05/20/18 08:54>





- Discharge


Clinical Impression: 


 Cholelithiasis and acute cholecystitis without obstruction





Condition: Stable


Scribe Attestation: 





05/20/18 08:25


I personally performed the services described in the documentation, reviewed 

and edited the documentation which was dictated to the scribe in my presence, 

and it accurately records my words and actions. (RAVEN VICTORIA)





Scribe Documentation





- Scribe


Written by Dharmesh:: Dharmesh Carrillo, 5/20/2018 0819


acting as scribe for :: Arnoldo





<DHARA HEARD - Last Filed: 05/20/18 08:54>

## 2018-05-20 NOTE — EKG REPORT
SEVERITY:- BORDERLINE ECG -

SINUS TACHYCARDIA

PROBABLE LEFT ATRIAL ABNORMALITY

BORDERLINE PROLONGED QT INTERVAL

:

Confirmed by: Herb Otto 20-May-2018 16:14:06

## 2018-05-20 NOTE — PDOC H&P
History of Present Illness


Admission Date/PCP: 


  05/20/18 09:09





  BENITEZ MORELAND MD





Patient complains of: RUQ abdominal pain


History of Present Illness: 


CARLOS LO is a 63 year old female with a 24 hrs hx of abdominal pain, 

nausea, emesis.  She presented to the ER with the above symptoms and an US of 

the GB has been done and it demonstrates cholelithiasis with normal appearing 

gallbladder.








Past Medical History


Cardiac Medical History: Reports: Hypertension


Pulmonary Medical History: Reports: Chronic Obstructive Pulmonary Disease (COPD)


Musculoskeltal Medical History: Reports: Arthritis





Past Surgical History


Past Surgical History: Reports: Hysterectomy





Social History


Lives with: Family


Smoking Status: Never Smoker


Frequency of Alcohol Use: None


Hx Recreational Drug Use: No


Drugs: None


Hx Prescription Drug Abuse: No





- Advance Directive


Resuscitation Status: Full Code





Family History


Family History: Reviewed & Not Pertinent


Parental Family History Reviewed: No


Children Family History Reviewed: No


Sibling(s) Family History Reviewed.: No





Medication/Allergy


Home Medications: 








Hydrochlorothiazide [Hydrochlorothiazide] 12.5 mg PO BID 05/20/18 


Metoprolol Tartrate [Lopressor 25 mg Tablet] 12.5 mg PO BID 05/20/18 








Allergies/Adverse Reactions: 


 





No Known Allergies Allergy (Verified 09/11/17 19:17)


 











Physical Exam


Vital Signs: 


 











Temp Pulse Resp BP Pulse Ox


 


 98.4 F   123 H  16   142/80 H  97 


 


 05/20/18 10:23  05/20/18 10:23  05/20/18 10:23  05/20/18 10:23  05/20/18 10:23











General appearance: PRESENT: no acute distress, cooperative


Head exam: PRESENT: atraumatic


Eye exam: PRESENT: EOMI


Mouth exam: PRESENT: moist, neck supple


Neck exam: PRESENT: full ROM


Respiratory exam: PRESENT: clear to auscultation todd


Cardiovascular exam: PRESENT: RRR


GI/Abdominal exam: PRESENT: Mckeon's sign, normal bowel sounds, soft, 

tenderness - right upper quadrant


Musculoskeletal exam: PRESENT: full ROM


Neurological exam: PRESENT: alert, altered


Psychiatric exam: PRESENT: flat affect





Results


Laboratory Results: 


 











  05/20/18





  10:09


 


Urine Color  YELLOW


 


Urine Appearance  CLEAR


 


Urine pH  7.0


 


Ur Specific Gravity  1.024


 


Urine Protein  30 H


 


Urine Glucose (UA)  50 H


 


Urine Ketones  20 H


 


Urine Blood  NEGATIVE


 


Urine Nitrite  NEGATIVE


 


Ur Leukocyte Esterase  NEGATIVE


 


Urine WBC (Auto)  1


 


Urine RBC (Auto)  0











Impressions: 


 





Abdomen Ultrasound  05/20/18 07:09


IMPRESSION:  GALLSTONES.  FATTY INFILTRATION OF THE LIVER.  OTHERWISE NORMAL 

RIGHT UPPER QUADRANT ULTRASOUND.


 














Assessment & Plan





- Diagnosis


(1) Cholelithiasis


Qualifiers: 


   Cholelithiasis location: gallbladder   Cholecystitis presence: without 

cholecystitis   Biliary obstruction: without biliary obstruction   Qualified 

Code(s): K80.20 - Calculus of gallbladder without cholecystitis without 

obstruction   


Is this a current diagnosis for this admission?: Yes   





- Plan Summary


Plan Summary: 





A/  





RUQ abdominal pain


US of the gallbladder shows cholelithiasis


Symptomatic choilelithiasis


normal blood work


PE demonstrated abdominal RUQ tenderness








P/





Admit


Clear liquid diet


NPO after midnight


Consent for laparoscopic cholecystectomy possible open, possible cholangiogram


Mefoxin 2 gr IVPB preop


IVF





Procedure, risks, benefits, complications, including bleeding from the liver 

and or injury of the common bile duct requiring transfer to a tertiary medical 

center for open repair have been discussed with the patient; she understands 

all the above, her questions were answered, and she decides to proceed.





In addition, the patient declines possible blood transfusion because of her 

Denominational believes (she is a [employer]); she has been fully explained risks 

and alternatives of blood transfusion and the  risks of not receiving blood 

products if needed, she understands all of them and still declines blood 

transfusion.  Anesthesia will be made aware.

## 2018-05-20 NOTE — EKG REPORT
SEVERITY:- BORDERLINE ECG -

SINUS TACHYCARDIA

BORDERLINE T ABNORMALITIES, INFERIOR LEADS

:

Confirmed by: Herb Otto 20-May-2018 16:14:14

## 2018-05-21 LAB
ALBUMIN SERPL-MCNC: 4.4 G/DL (ref 3.5–5)
ALP SERPL-CCNC: 90 U/L (ref 38–126)
ALT SERPL-CCNC: 18 U/L (ref 9–52)
ANION GAP SERPL CALC-SCNC: 13 MMOL/L (ref 5–19)
AST SERPL-CCNC: 22 U/L (ref 14–36)
BILIRUB DIRECT SERPL-MCNC: 0.3 MG/DL (ref 0–0.4)
BILIRUB SERPL-MCNC: 1.3 MG/DL (ref 0.2–1.3)
BUN SERPL-MCNC: 15 MG/DL (ref 7–20)
CALCIUM: 9.7 MG/DL (ref 8.4–10.2)
CHLORIDE SERPL-SCNC: 108 MMOL/L (ref 98–107)
CO2 SERPL-SCNC: 27 MMOL/L (ref 22–30)
ERYTHROCYTE [DISTWIDTH] IN BLOOD BY AUTOMATED COUNT: 17.3 % (ref 11.5–14)
GLUCOSE SERPL-MCNC: 118 MG/DL (ref 75–110)
HCT VFR BLD CALC: 35.6 % (ref 36–47)
HGB BLD-MCNC: 12.3 G/DL (ref 12–15.5)
MCH RBC QN AUTO: 28 PG (ref 27–33.4)
MCHC RBC AUTO-ENTMCNC: 34.4 G/DL (ref 32–36)
MCV RBC AUTO: 81 FL (ref 80–97)
PLATELET # BLD: 254 10^3/UL (ref 150–450)
POTASSIUM SERPL-SCNC: 4.4 MMOL/L (ref 3.6–5)
PROT SERPL-MCNC: 7.3 G/DL (ref 6.3–8.2)
RBC # BLD AUTO: 4.39 10^6/UL (ref 3.72–5.28)
SODIUM SERPL-SCNC: 147.7 MMOL/L (ref 137–145)
WBC # BLD AUTO: 6.3 10^3/UL (ref 4–10.5)

## 2018-05-21 PROCEDURE — 0FT44ZZ RESECTION OF GALLBLADDER, PERCUTANEOUS ENDOSCOPIC APPROACH: ICD-10-PCS | Performed by: SURGERY

## 2018-05-21 RX ADMIN — FAMOTIDINE SCH MG: 10 INJECTION INTRAVENOUS at 09:52

## 2018-05-21 RX ADMIN — Medication SCH MG: at 17:33

## 2018-05-21 RX ADMIN — SODIUM CHLORIDE PRN ML: 9 INJECTION, SOLUTION INTRAVENOUS at 05:10

## 2018-05-21 RX ADMIN — FAMOTIDINE SCH MG: 10 INJECTION INTRAVENOUS at 21:51

## 2018-05-21 RX ADMIN — PROMETHAZINE HYDROCHLORIDE PRN MG: 25 INJECTION INTRAMUSCULAR; INTRAVENOUS at 06:28

## 2018-05-21 NOTE — OPERATIVE REPORT
Operative Report


DATE OF SURGERY: 05/21/18


PREOPERATIVE DIAGNOSIS: Acute cholecystitis with cholelithiasis


POSTOPERATIVE DIAGNOSIS: Same


OPERATION: Laparoscopic cholecystectomy


SURGEON: KENIA ZARCO


ANESTHESIA: GA


TISSUE REMOVED OR ALTERED: 1 gallbladder with contents


COMPLICATIONS: 





None


ESTIMATED BLOOD LOSS: Scant


INTRAOPERATIVE FINDINGS: See below


PROCEDURE: 





After obtaining informed consent, the patient was taken to the operating room.  

General  Anesthesia was induced; the arms were extended, and the abdomen was 

exposed, and prepped and draped in a sterile fashion.  Instrumentation was set 

up for laparoscopic cholecystectomy.


 


Surgical plan and surgical timeout were conducted.


 


A vertical incision was made above the umbilicus, and a verres needle was 

inserted uneventfully into the peritoneal cavity.  Pneumoperitoneum was 

established.


 


The verres needle was removed and a 5 mm trocar was inserted and a 5 mm 

flexible laparoscope was inserted.  Visualization of the peritoneal cavity 

confirmed safe uneventful entry.  Under direct visualization 3 additional 5 mm 

ports were established, one in the subxiphoid position and second in the 

subcostal position. 





 Visualization of the hepatobiliary anatomy revealed no anatomic variations.  

There is moderate to marked edema consistent with acute cholecystitis.  

Gallbladder was aspirated of approximately 70 cc of light green bile.  A 

grasper was placed on the fundus of the gallbladder and the gallbladder is 

elevated over the right surface of the liver; a second grasper was used to 

grasp the infundibulum of the gallbladder.  The neck of the gallbladder and 

junction with the cystic duct was dissected out.  The Cystic artery was in its 

usual location medial and cephalad to the cystic duct.  The cystic artery was 

surrounded with a right angle clamp, clipped twice proximally and divided with 

laparoscopic scissors.


 


We now opened the triangle of Calot by dividing the peritoneal reflection on 

both the medial and lateral sides of the cystic duct infundibular junction.  

The critical view was obtained.  Photos were obtained.  The cystic duct was 

slightly thicker than average.  We now milked the cystic duct of any possible 

stones, clipped the cystic duct approximately 2 times once distally and divided 

with scissors.  We were not completely satisfied with the positioning of the 

clips across the cystic duct stump so we plan to revisit this after the 

gallbladder was removed.


 


The gallbladder was now removed from the undersurface of the liver using hook 

cautery dissection.  Graspers were repositioned and the gallbladder was removed 

uneventfully from the abdominal cavity through the super umbilical port site 

incision.  The specimen was examined, then passed off to pathology for 

permanent analysis.  Some bile did spill into the peritoneal cavity and this 

was aspirated.


 


We returned to the peritoneal cavity check for bleeding, and evidence of bile 

leak, and there was none.  We now reinspected the cystic duct and felt that 

removal of the clips and replacement with a Endoloop was indicated.  Clips were 

removed and a 0 Endoloop was placed across the cystic duct stump securing it 

satisfactorily.  Photos were taken.  The clips on the cystic artery were 

secured .  At this point we felt  the operation was complete.  The subcutaneous 

tissue was then anesthetized  with quarter percent Marcaine Sponge and needle 

counts are correct.  All ports removed under direct visualization 

pneumoperitoneum evacuated, and 5 mm port wounds closed with 3-0 Vicryl suture, 

benzoin and Steri-Strips.


 


The patient was extubated, and taken to the recovery room in stable condition.

## 2018-05-21 NOTE — PDOC PROGRESS REPORT
Subjective


Progress Note for:: 05/21/18


Subjective:: 





Patient still having some right upper quadrant pain.  She has been n.p.o. since 

midnight.


Reason For Visit: 


SYMPTOMATIC CHOLELITHIASIS








Pain right upper quadrant





Physical Exam


Vital Signs: 


 











Temp Pulse Resp BP Pulse Ox


 


 98.4 F   93   16   144/78 H  97 


 


 05/21/18 07:48  05/21/18 07:48  05/21/18 07:48  05/21/18 07:48  05/21/18 07:48








 Intake & Output











 05/20/18 05/21/18 05/22/18





 06:59 06:59 06:59


 


Intake Total  120 


 


Balance  120 


 


Weight  100.4 kg 











General appearance: PRESENT: no acute distress


GI/Abdominal exam: PRESENT: other - Some right upper quadrant tenderness with 

mild guarding no rigidity no peritoneal signs





Results


Laboratory Results: 


 





 05/21/18 06:05 





 05/21/18 06:05 





 











  05/20/18 05/21/18 05/21/18





  10:09 06:05 06:05


 


WBC   6.3 


 


RBC   4.39 


 


Hgb   12.3 


 


Hct   35.6 L 


 


MCV   81 


 


MCH   28.0 


 


MCHC   34.4 


 


RDW   17.3 H 


 


Plt Count   254 


 


Sodium    147.7 H


 


Potassium    4.4


 


Chloride    108 H


 


Carbon Dioxide    27


 


Anion Gap    13


 


BUN    15


 


Creatinine    1.10


 


Est GFR ( Amer)    > 60


 


Est GFR (Non-Af Amer)    50 L


 


Glucose    118 H


 


Calcium    9.7


 


Total Bilirubin    1.3


 


AST    22


 


ALT    18


 


Alkaline Phosphatase    90


 


Total Protein    7.3


 


Albumin    4.4


 


Urine Color  YELLOW  


 


Urine Appearance  CLEAR  


 


Urine pH  7.0  


 


Ur Specific Gravity  1.024  


 


Urine Protein  30 H  


 


Urine Glucose (UA)  50 H  


 


Urine Ketones  20 H  


 


Urine Blood  NEGATIVE  


 


Urine Nitrite  NEGATIVE  


 


Ur Leukocyte Esterase  NEGATIVE  


 


Urine WBC (Auto)  1  


 


Urine RBC (Auto)  0  











Impressions: 


 





Abdomen Ultrasound  05/20/18 07:09


IMPRESSION:  GALLSTONES.  FATTY INFILTRATION OF THE LIVER.  OTHERWISE NORMAL 

RIGHT UPPER QUADRANT ULTRASOUND.


 














Assessment & Plan





- Diagnosis


(1) Cholelithiasis and acute cholecystitis without obstruction


Is this a current diagnosis for this admission?: Yes   


Plan: 


Symptomatic cholelithiasis cholecystitis; no evidence of biliary tract 

obstruction.





Recommendations:





1.  Proceed with laparoscopic, possible open cholecystectomy, 1 hour, [facility], general anesthesia.





Risk benefits and alternatives of planned procedure with patient including 

bleeding, infection, bile duct injury, need for additional surgery.


2.  She expresses her understanding agrees to proceed. 





3.  Patient is a [employer].  She declines blood transfusion, platelet 

transfusion, but will accept a plasma transfusion if needed.  I explained to 

her the likelihood of her requiring  any blood product transfusion was less 

than 5%.  We will respect her wishes.

## 2018-05-22 VITALS — SYSTOLIC BLOOD PRESSURE: 135 MMHG | DIASTOLIC BLOOD PRESSURE: 67 MMHG

## 2018-05-22 RX ADMIN — FAMOTIDINE SCH MG: 10 INJECTION INTRAVENOUS at 12:58

## 2018-05-22 RX ADMIN — SODIUM CHLORIDE PRN ML: 9 INJECTION, SOLUTION INTRAVENOUS at 00:29

## 2018-05-22 RX ADMIN — Medication SCH MG: at 00:30

## 2018-05-22 RX ADMIN — Medication SCH MG: at 08:31

## 2018-05-22 RX ADMIN — Medication SCH MG: at 13:02

## 2018-05-22 NOTE — DISCHARGE SUMMARY E
Discharge Summary



NAME: CARLOS LO

MRN:  [id number]        : 1955     AGE: 63Y

ADMITTED: 2018                  DISCHARGED: 2018



DISCHARGE DIAGNOSIS:

ACUTE CHOLECYSTITIS.



PROCEDURE PERFORMED DURING HOSPITALIZATION:

Laparoscopic cholecystectomy performed by Dr. Alexander Olivarez on

2018.



HOSPITAL COURSE:

The patient was admitted.  She underwent the above mentioned surgery.  She

did well postoperatively.  She was feeling much better after the surgery.



She has now been discharged to home in good condition.  She will follow up

with [facility] in 2 weeks.  She is encouraged to stay active

at home.  She may shower tomorrow.  She may gradually increase her diet

from a liquid diet to a low-fat diet at home.  She may resume her home

medications.  Additional medication is Percocet, 1 p.o. every 4 hours

p.r.n. pain.







DICTATING PHYSICIAN:  STEPHANIE GARNICA M.D.





[id number]                  DT: 2018    1115

PHY#: [id number]            DD: 2018    1110

ID:   [id number]           JOB#: [id number]       ACCT: [id number]



cc:ETHEL JOHNSON M.D.

>

## 2018-05-22 NOTE — PDOC PROGRESS REPORT
Subjective


Progress Note for:: 05/22/18


Subjective:: 





Feels better after surgery.  Had some right upper quadrant abdominal pain 

improved with pain medication.  Tolerating liquids well.


Reason For Visit: 


SYMPTOMATIC CHOLELITHIASIS








Physical Exam


Vital Signs: 


 











Temp Pulse Resp BP Pulse Ox


 


 98.5 F   100   20   146/88 H  98 


 


 05/22/18 07:47  05/22/18 07:47  05/22/18 07:47  05/22/18 07:47  05/22/18 09:39








 Intake & Output











 05/21/18 05/22/18 05/23/18





 06:59 06:59 06:59


 


Intake Total 120 2020 


 


Output Total  302 


 


Balance 120 1718 


 


Weight 100.4 kg 105.1 kg 











General appearance: PRESENT: no acute distress, cooperative


Respiratory exam: PRESENT: clear to auscultation todd


Cardiovascular exam: PRESENT: RRR


GI/Abdominal exam: PRESENT: other - Soft, nondistended, mild right upper 

quadrant abdominal tenderness without peritoneal signs.  Dressings are all 

intact.


Extremities exam: PRESENT: other - No swelling and no tenderness


Psychiatric exam: PRESENT: appropriate affect





Results


Laboratory Results: 


 





 05/21/18 06:05 





 05/21/18 06:05 





 











  05/21/18





  11:15


 


Blood Type  A POSITIVE


 


Antibody Screen  NEGATIVE











Impressions: 


 





Abdomen Ultrasound  05/20/18 07:09


IMPRESSION:  GALLSTONES.  FATTY INFILTRATION OF THE LIVER.  OTHERWISE NORMAL 

RIGHT UPPER QUADRANT ULTRASOUND.


 














Assessment & Plan





- Diagnosis


(1) Cholelithiasis and acute cholecystitis without obstruction


Is this a current diagnosis for this admission?: Yes   


Plan: 


Status post laparoscopic cholecystectomy.  Patient doing well.  Will discharge 

patient home.

## 2018-06-20 ENCOUNTER — HOSPITAL ENCOUNTER (OUTPATIENT)
Dept: HOSPITAL 62 - LAB | Age: 63
End: 2018-06-20
Attending: FAMILY MEDICINE
Payer: COMMERCIAL

## 2018-06-20 ENCOUNTER — HOSPITAL ENCOUNTER (OUTPATIENT)
Dept: HOSPITAL 62 - RAD | Age: 63
End: 2018-06-20
Attending: FAMILY MEDICINE
Payer: COMMERCIAL

## 2018-06-20 DIAGNOSIS — R10.13: Primary | ICD-10-CM

## 2018-06-20 LAB
ADD MANUAL DIFF: NO
AMYLASE SERPL-CCNC: 48 U/L (ref 30–110)
ANION GAP SERPL CALC-SCNC: 15 MMOL/L (ref 5–19)
BASOPHILS # BLD AUTO: 0 10^3/UL (ref 0–0.2)
BASOPHILS NFR BLD AUTO: 1.1 % (ref 0–2)
BUN SERPL-MCNC: 9 MG/DL (ref 7–20)
CALCIUM: 10.3 MG/DL (ref 8.4–10.2)
CHLORIDE SERPL-SCNC: 104 MMOL/L (ref 98–107)
CO2 SERPL-SCNC: 29 MMOL/L (ref 22–30)
EOSINOPHIL # BLD AUTO: 0.2 10^3/UL (ref 0–0.6)
EOSINOPHIL NFR BLD AUTO: 4.9 % (ref 0–6)
ERYTHROCYTE [DISTWIDTH] IN BLOOD BY AUTOMATED COUNT: 17.2 % (ref 11.5–14)
GLUCOSE SERPL-MCNC: 97 MG/DL (ref 75–110)
HCT VFR BLD CALC: 37.1 % (ref 36–47)
HGB BLD-MCNC: 12.8 G/DL (ref 12–15.5)
LIPASE SERPL-CCNC: 67.5 U/L (ref 23–300)
LYMPHOCYTES # BLD AUTO: 1.7 10^3/UL (ref 0.5–4.7)
LYMPHOCYTES NFR BLD AUTO: 38.3 % (ref 13–45)
MCH RBC QN AUTO: 27.9 PG (ref 27–33.4)
MCHC RBC AUTO-ENTMCNC: 34.5 G/DL (ref 32–36)
MCV RBC AUTO: 81 FL (ref 80–97)
MONOCYTES # BLD AUTO: 0.3 10^3/UL (ref 0.1–1.4)
MONOCYTES NFR BLD AUTO: 8 % (ref 3–13)
NEUTROPHILS # BLD AUTO: 2.1 10^3/UL (ref 1.7–8.2)
NEUTS SEG NFR BLD AUTO: 47.7 % (ref 42–78)
PLATELET # BLD: 272 10^3/UL (ref 150–450)
POTASSIUM SERPL-SCNC: 4.4 MMOL/L (ref 3.6–5)
RBC # BLD AUTO: 4.59 10^6/UL (ref 3.72–5.28)
SODIUM SERPL-SCNC: 148.2 MMOL/L (ref 137–145)
TOTAL CELLS COUNTED % (AUTO): 100 %
WBC # BLD AUTO: 4.3 10^3/UL (ref 4–10.5)

## 2018-06-20 PROCEDURE — 85025 COMPLETE CBC W/AUTO DIFF WBC: CPT

## 2018-06-20 PROCEDURE — 36415 COLL VENOUS BLD VENIPUNCTURE: CPT

## 2018-06-20 PROCEDURE — 83690 ASSAY OF LIPASE: CPT

## 2018-06-20 PROCEDURE — 80048 BASIC METABOLIC PNL TOTAL CA: CPT

## 2018-06-20 PROCEDURE — 82150 ASSAY OF AMYLASE: CPT

## 2018-06-20 PROCEDURE — 74176 CT ABD & PELVIS W/O CONTRAST: CPT

## 2018-06-20 NOTE — RADIOLOGY REPORT (SQ)
EXAM DESCRIPTION:  CT ABD/PELVIS NO ORAL OR IV



COMPLETED DATE/TIME:  6/20/2018 11:38 am



REASON FOR STUDY:  EPIGASTRIC ABD PAIN (R10.13) R10.13  EPIGASTRIC PAIN



COMPARISON:  Abdominal ultrasound dated 5/20/2018



TECHNIQUE:  CT scan of the abdomen and pelvis performed without intravenous or oral contrast. Images 
reviewed with lung, soft tissue, and bone windows. Reconstructed coronal and sagittal MPR images revi
ewed. All images stored on PACS.

All CT scanners at this facility use dose modulation, iterative reconstruction, and/or weight based d
osing when appropriate to reduce radiation dose to as low as reasonably achievable (ALARA).

[facility]: Dose Right  [facility]: CareDose    [facility]: Dose Right    [facility]: Teradose 4D    [facility]: Smart Delpor



RADIATION DOSE:  mGy.



LIMITATIONS:  None.



FINDINGS:  LOWER CHEST: No significant findings. No nodules or infiltrates.

NON-CONTRASTED LIVER, SPLEEN, ADRENALS: Evaluation limited by lack of IV contrast. No identified sign
ificant masses.

PANCREAS: No masses. No peripancreatic inflammatory changes.

GALLBLADDER: Status post cholecystectomy

RIGHT KIDNEY AND URETER: No suspicious masses. Assessment limited by lack of IV contrast.   No signif
icant calcifications.   No hydronephrosis or hydroureter.

LEFT KIDNEY AND URETER: No suspicious masses. Assessment limited by lack of IV contrast.   No signifi
cant calcifications.   No hydronephrosis or hydroureter.

AORTA AND RETROPERITONEUM: No aneurysm.  There is some minimal soft tissue stranding in the central m
esenteric fat with associated multiple prominent but nonenlarged mesenteric lymph nodes.  This is a n
onspecific finding but could be related to edematous or inflammatory changes.  This is not a typical 
location but the possibility of a mesenteric adenitis should be considered.  The possibility of a ricky
plastic process cannot be completely excluded.

BOWEL AND PERITONEAL CAVITY: No obvious masses or inflammatory changes. No free fluid.  Few scattered
 diverticula are identified in the sigmoid colon without CT evidence for diverticulitis P

APPENDIX: Normal.

PELVIS, BLADDER, AND ABDOMINAL WALL:No abnormal masses. No free fluid. Bladder normal.

BONES: Degenerative changes are identified in the lumbar spine

OTHER: No other significant finding.



IMPRESSION:  Minimal soft tissue stranding in the central mesenteric fat as noted above with associat
ed prominent but nonenlarged mesenteric lymph nodes.  This is a nonspecific finding but could be rela
billy to edematous or inflammatory changes.  The possibility of a mesenteric adenitis should be conside
red as noted above.  Possibility of a neoplastic process cannot be excluded.  Clinical correlation is
 recommended.  Other findings as noted above



COMMENT:  Quality ID # 436: Final reports with documentation of one or more dose reduction techniques
 (e.g., Automated exposure control, adjustment of the mA and/or kV according to patient size, use of 
iterative reconstruction technique)



TECHNICAL DOCUMENTATION:  JOB ID:  [id number]

 2011 [employer]- All Rights Reserved



Reading location - IP/workstation name: [email or web address]

## 2018-06-27 ENCOUNTER — HOSPITAL ENCOUNTER (INPATIENT)
Dept: HOSPITAL 62 - ER | Age: 63
LOS: 3 days | Discharge: HOME | DRG: 438 | End: 2018-06-30
Attending: SURGERY | Admitting: SURGERY
Payer: COMMERCIAL

## 2018-06-27 DIAGNOSIS — Z90.49: ICD-10-CM

## 2018-06-27 DIAGNOSIS — K83.1: ICD-10-CM

## 2018-06-27 DIAGNOSIS — Z90.710: ICD-10-CM

## 2018-06-27 DIAGNOSIS — J44.9: ICD-10-CM

## 2018-06-27 DIAGNOSIS — K85.10: Primary | ICD-10-CM

## 2018-06-27 DIAGNOSIS — I10: ICD-10-CM

## 2018-06-27 DIAGNOSIS — M19.90: ICD-10-CM

## 2018-06-27 LAB
ADD MANUAL DIFF: NO
ALBUMIN SERPL-MCNC: 5.1 G/DL (ref 3.5–5)
ALP SERPL-CCNC: 382 U/L (ref 38–126)
ALT SERPL-CCNC: 289 U/L (ref 9–52)
ANION GAP SERPL CALC-SCNC: 19 MMOL/L (ref 5–19)
AST SERPL-CCNC: 524 U/L (ref 14–36)
BASOPHILS # BLD AUTO: 0 10^3/UL (ref 0–0.2)
BASOPHILS NFR BLD AUTO: 0.3 % (ref 0–2)
BILIRUB DIRECT SERPL-MCNC: 5.1 MG/DL (ref 0–0.4)
BILIRUB SERPL-MCNC: 7.2 MG/DL (ref 0.2–1.3)
BUN SERPL-MCNC: 10 MG/DL (ref 7–20)
CALCIUM: 10.1 MG/DL (ref 8.4–10.2)
CHLORIDE SERPL-SCNC: 103 MMOL/L (ref 98–107)
CO2 SERPL-SCNC: 22 MMOL/L (ref 22–30)
EOSINOPHIL # BLD AUTO: 0 10^3/UL (ref 0–0.6)
EOSINOPHIL NFR BLD AUTO: 0.2 % (ref 0–6)
ERYTHROCYTE [DISTWIDTH] IN BLOOD BY AUTOMATED COUNT: 17.8 % (ref 11.5–14)
GLUCOSE SERPL-MCNC: 210 MG/DL (ref 75–110)
HCT VFR BLD CALC: 36.9 % (ref 36–47)
HGB BLD-MCNC: 13.2 G/DL (ref 12–15.5)
LIPASE SERPL-CCNC: (no result) U/L (ref 23–300)
LYMPHOCYTES # BLD AUTO: 1.7 10^3/UL (ref 0.5–4.7)
LYMPHOCYTES NFR BLD AUTO: 20.6 % (ref 13–45)
MCH RBC QN AUTO: 28.7 PG (ref 27–33.4)
MCHC RBC AUTO-ENTMCNC: 35.7 G/DL (ref 32–36)
MCV RBC AUTO: 80 FL (ref 80–97)
MONOCYTES # BLD AUTO: 0.4 10^3/UL (ref 0.1–1.4)
MONOCYTES NFR BLD AUTO: 4.9 % (ref 3–13)
NEUTROPHILS # BLD AUTO: 6.3 10^3/UL (ref 1.7–8.2)
NEUTS SEG NFR BLD AUTO: 74 % (ref 42–78)
PLATELET # BLD: 310 10^3/UL (ref 150–450)
POTASSIUM SERPL-SCNC: 3.2 MMOL/L (ref 3.6–5)
PROT SERPL-MCNC: 8.9 G/DL (ref 6.3–8.2)
RBC # BLD AUTO: 4.59 10^6/UL (ref 3.72–5.28)
SODIUM SERPL-SCNC: 143.8 MMOL/L (ref 137–145)
TOTAL CELLS COUNTED % (AUTO): 100 %
WBC # BLD AUTO: 8.4 10^3/UL (ref 4–10.5)

## 2018-06-27 PROCEDURE — 43264 ERCP REMOVE DUCT CALCULI: CPT

## 2018-06-27 PROCEDURE — 85610 PROTHROMBIN TIME: CPT

## 2018-06-27 PROCEDURE — 76705 ECHO EXAM OF ABDOMEN: CPT

## 2018-06-27 PROCEDURE — 85025 COMPLETE CBC W/AUTO DIFF WBC: CPT

## 2018-06-27 PROCEDURE — 99285 EMERGENCY DEPT VISIT HI MDM: CPT

## 2018-06-27 PROCEDURE — 83690 ASSAY OF LIPASE: CPT

## 2018-06-27 PROCEDURE — 96376 TX/PRO/DX INJ SAME DRUG ADON: CPT

## 2018-06-27 PROCEDURE — 80076 HEPATIC FUNCTION PANEL: CPT

## 2018-06-27 PROCEDURE — 80048 BASIC METABOLIC PNL TOTAL CA: CPT

## 2018-06-27 PROCEDURE — 80053 COMPREHEN METABOLIC PANEL: CPT

## 2018-06-27 PROCEDURE — 96361 HYDRATE IV INFUSION ADD-ON: CPT

## 2018-06-27 PROCEDURE — 93976 VASCULAR STUDY: CPT

## 2018-06-27 PROCEDURE — 96375 TX/PRO/DX INJ NEW DRUG ADDON: CPT

## 2018-06-27 PROCEDURE — 96374 THER/PROPH/DIAG INJ IV PUSH: CPT

## 2018-06-27 PROCEDURE — 36415 COLL VENOUS BLD VENIPUNCTURE: CPT

## 2018-06-27 PROCEDURE — 74328 X-RAY BILE DUCT ENDOSCOPY: CPT

## 2018-06-27 PROCEDURE — 43262 ENDO CHOLANGIOPANCREATOGRAPH: CPT

## 2018-06-27 RX ADMIN — PIPERACILLIN AND TAZOBACTAM SCH GM: 3; .375 INJECTION, POWDER, LYOPHILIZED, FOR SOLUTION INTRAVENOUS; PARENTERAL at 18:03

## 2018-06-27 RX ADMIN — SODIUM CHLORIDE PRN ML: 9 INJECTION, SOLUTION INTRAVENOUS at 23:10

## 2018-06-27 RX ADMIN — MORPHINE SULFATE PRN MG: 10 INJECTION INTRAMUSCULAR; INTRAVENOUS; SUBCUTANEOUS at 23:04

## 2018-06-27 RX ADMIN — SODIUM CHLORIDE PRN ML: 9 INJECTION, SOLUTION INTRAVENOUS at 15:09

## 2018-06-27 NOTE — PDOC H&P
History of Present Illness


Admission Date/PCP: 


  06/27/18 11:32





  BENITEZ MORELAND MD





Patient complains of: abdominal pains


History of Present Illness: 


CARLOS LO is a 63 year old female who had laparoscopic cholecystectomy 5/21 /18 by Dr Olivarez. Patient claims she has been having on and off abdominal 

pains with nausea. Came in today for severe RUQ pains. Laboratory showed 

elevated LFT's and lipase. Consulted GI Dr Armenta for ERCP.








Past Medical History


Cardiac Medical History: Reports: Hypertension


Pulmonary Medical History: Reports: Chronic Obstructive Pulmonary Disease (COPD)


Musculoskeltal Medical History: Reports: Arthritis





Past Surgical History


Past Surgical History: Reports: Cholecystectomy - lap inna 5/21/18, 

Hysterectomy





Social History


Lives with: Family


Smoking Status: Never Smoker


Frequency of Alcohol Use: None


Hx Recreational Drug Use: No


Drugs: None


Hx Prescription Drug Abuse: No





- Advance Directive


Resuscitation Status: Full Code





Family History


Family History: Reviewed & Not Pertinent


Parental Family History Reviewed: Yes


Children Family History Reviewed: No


Sibling(s) Family History Reviewed.: No





Medication/Allergy


Home Medications: 








Hydrochlorothiazide 25 mg PO DAILY 05/20/18 


Metoprolol Tartrate [Lopressor 25 mg Tablet] 12.5 mg PO Q12 05/20/18 


Multivit-Minerals/Folic Acid [One-A-Day Women Vitacraves] 200 mcg PO DAILY 06/27 /18 








Allergies/Adverse Reactions: 


 





No Known Allergies Allergy (Verified 06/27/18 13:11)


 











Review of Systems


All systems: reviewed and no additional remarkable complaints except as stated 

- abdominal pains with nausea





Physical Exam


Vital Signs: 


 











Temp Pulse Resp BP Pulse Ox


 


 98.2 F   101 H  22 H  170/89 H  94 


 


 06/27/18 12:21  06/27/18 09:22  06/27/18 13:01  06/27/18 13:00  06/27/18 13:01











General appearance: PRESENT: mild distress


Eye exam: PRESENT: scleral icterus


Mouth exam: PRESENT: moist


Neck exam: PRESENT: full ROM


Respiratory exam: PRESENT: clear to auscultation todd


Cardiovascular exam: PRESENT: RRR


Pulses: PRESENT: normal radial pulses


Vascular exam: PRESENT: normal capillary refill


GI/Abdominal exam: PRESENT: soft, tenderness - RUQ


Rectal exam: PRESENT: deferred


Extremities exam: PRESENT: full ROM


Musculoskeletal exam: PRESENT: ambulatory


Neurological exam: PRESENT: alert, oriented to person, oriented to place, 

oriented to time, oriented to situation


Psychiatric exam: PRESENT: anxious


Skin exam: PRESENT: normal color, warm





Results


Impressions: 


 





Abdomen Ultrasound  06/27/18 06:48


IMPRESSION:  1.  Status post cholecystectomy.  No regional fluid collections.  

There is duct prominence.  This includes intrahepatic, common and pancreatic.  

No duct stones are appreciated as assessed.


 














Assessment & Plan





- Diagnosis


(1) Elevated liver function tests


Is this a current diagnosis for this admission?: Yes   





(2) Elevated lipase


Is this a current diagnosis for this admission?: Yes   





- Time


Time Spent: 30 to 50 Minutes





- Inpatient Certification


Medical Necessity: Failure to Improve With Outpatient Therapy, Need Close 

Monitoring Due to Risk of Patient Decompensation, Need For IV Fluids, Need For 

Continuous Telemetry Monitoring, Need for Pain Control, Need for IV Antibiotics

, Need for Surgery, Risk of Complication if Not Cared For in Hospital





- Plan Summary


Plan Summary: 





NPO


Hydrate


IV antibiotics


For ERCP c/o Dr Armenta

## 2018-06-27 NOTE — RADIOLOGY REPORT (SQ)
EXAM DESCRIPTION:  U/S ABDOMEN LTD W/DOPPLER



COMPLETED DATE/TIME:  6/27/2018 9:10 am



REASON FOR STUDY:  s/p gallbladder removal on 6/20, abd pain



COMPARISON:  5/20/2018 preoperative study.  CT abdomen and pelvis 6/20/2018.



TECHNIQUE:  Dynamic and static grayscale images acquired of the abdomen and recorded on PACS. Additio
nal selected color Doppler and spectral images recorded.



LIMITATIONS:  None.



FINDINGS:  PANCREAS: Normal echogenicity without mass suggested.  No surrounding fluid.  Duct distend
ed, just under 4 mm.

LIVER: No masses. Echotexture normal.

LIVER VASCULATURE: Normal directional flow of the main portal vein and hepatic veins.

GALLBLADDER: Surgically absent.  No regional fluid collections.

ULTRASOUND-DETECTED SNYDER'S SIGN: Not applicable.

INTRAHEPATIC DUCTS AND COMMON DUCT: Central intrahepatic duct prominence.  Common duct measures up to
 1.4 cm proximally.  As assessed, no duct stones.

INFERIOR VENA CAVA: Normal flow.

AORTA: No aneurysm.

RIGHT KIDNEY:  Normal size. Normal echogenicity. No solid or suspicious masses. No hydronephrosis. No
 calcifications.

PERITONEAL AND RIGHT PLEURAL SPACE: No ascites or effusions.

OTHER: No other significant findings.



IMPRESSION:  1.  Status post cholecystectomy.  No regional fluid collections.  There is duct prominen
ce.  This includes intrahepatic, common and pancreatic.  No duct stones are appreciated as assessed.



TECHNICAL DOCUMENTATION:  JOB ID:  [id number]

 2011 [employer]- All Rights Reserved



Reading location - IP/workstation name: [email or web address]

## 2018-06-27 NOTE — ER DOCUMENT REPORT
ED GI/





- General


Mode of Arrival: Ambulatory


Information source: Patient, Relative


TRAVEL OUTSIDE OF THE [place] IN LAST 30 DAYS: No





<DHARA HEARD - Last Filed: 06/27/18 11:05>





<ESTEPHANIE VALDES - Last Filed: 06/28/18 06:24>





- General


Chief Complaint: Abdominal Pain


Stated Complaint: ABDOMINAL PAIN


Time Seen by Provider: 06/27/18 06:45


Notes: 





Patient is a 63-year-old female that presents to the emergency department today 

with complaints of generalized abdominal pain.  Patient has been seen for 

abdominal pain several times over the last few months and subsequently had a 

cholecystectomy 4 weeks ago.  Daughter at bedside states the patient's 

abdominal pain began again 5 days after the cholecystectomy and it has 

continued to get worse.  Daughter states that the patient has lost 14 pounds 

since surgery because she cannot eat because "all she does is vomit".  Patient 

has seen her surgeon twice since the surgery and was told "it is a slow healing 

process".  Daughter states that surgery is "trying to get her insurance to do 

an upper scope".  Patient had a normal bowel movement last night.  Patient 

denies any fevers. (DHARA HEARD)





- Related Data


Allergies/Adverse Reactions: 


 





No Known Allergies Allergy (Verified 06/27/18 13:11)


 











Past Medical History





- General


Information source: Patient, [facility] Records





- Social History


Smoking Status: Never Smoker


Cigarette use (# per day): No


Chew tobacco use (# tins/day): No


Frequency of alcohol use: None


Drug Abuse: None


Lives with: Family


Family History: Reviewed & Not Pertinent


Patient has suicidal ideation: No


Patient has homicidal ideation: No





- Past Medical History


Cardiac Medical History: Reports: Hx Hypertension


Pulmonary Medical History: Reports: Hx COPD


Musculoskeltal Medical History: Reports Hx Arthritis


Past Surgical History: Reports: Hx Hysterectomy





- Immunizations


Hx Diphtheria, Pertussis, Tetanus Vaccination: Yes





<DHARA HEARD - Last Filed: 06/27/18 11:05>





Review of Systems





- Review of Systems


Constitutional: denies: Fever


EENT: No symptoms reported


Cardiovascular: No symptoms reported


Respiratory: No symptoms reported


Gastrointestinal: See HPI, Abdominal pain, Vomiting


Genitourinary: No symptoms reported


Female Genitourinary: No symptoms reported


Musculoskeletal: No symptoms reported


Skin: No symptoms reported


Hematologic/Lymphatic: No symptoms reported


Neurological/Psychological: No symptoms reported


-: Yes All other systems reviewed and negative





<DHARA HEARD - Last Filed: 06/27/18 11:05>





Physical Exam





<DHARA HEARD - Last Filed: 06/27/18 11:05>





<ESTEPHANIE VALDES KIRTI - Last Filed: 06/28/18 06:24>





- Vital signs


Vitals: 


 











Pulse Resp BP Pulse Ox


 


 121 H  20   149/118 H  100 


 


 06/27/18 06:20  06/27/18 06:20  06/27/18 06:20  06/27/18 06:20














- Notes


Notes: 





Physical Exam:


 


General: Alert, appears in moderate distress, writhing around the bed and 

moaning non-stop during the entire interaction. 


 


HEENT: Normocephalic. Atraumatic. PERRL. Extraocular movements intact. 

Oropharynx clear.


 


Neck: Supple. Non-tender.


 


Respiratory: No respiratory distress. Clear and equal breath sounds bilaterally.


 


Cardiovascular: Tachycardic, regular rhythm. 


 


Abdominal: Epigastric and RUQ tenderness with palpation. No distension. Normal 

Bowel Sounds. 


 


Back: Non-tender. No deformity or step off.


 


Extremities: Moves all four extremities.


Upper extremities: Normal inspection. Normal ROM.  


Lower extremities: Normal inspection. No edema. Normal ROM.


 


Neurological: Normal cognition. AAOx4. Normal speech.  


 


Psychological: Normal affect. Normal Mood. 


 


Skin: Warm. Dry. Normal color. (DHARA HEARD)





Course





- Laboratory


Result Diagrams: 


 06/27/18 06:35





 06/27/18 06:35





<DHARA HEARD - Last Filed: 06/27/18 11:05>





- Laboratory


Result Diagrams: 


 06/27/18 06:35





 06/27/18 06:35





<ESTEPHANIE VALDES KIRTI - Last Filed: 06/28/18 06:24>





- Re-evaluation


Re-evalutation: 








06/27/18 10:06


[facility], [facility], and [facility] and none of them 

have beds.





[facility] has accepted the patient however they have no MedSurg beds





06/27/18 11:05


Consulted Dr. Webber who is now at bedside (DHARA HEARD)





06/27/18 09:27


Patient labs show elevated lipase and transaminitis concerning for common bile 

duct obstruction.  No GI specialty phone call this hospital.  Discussed with 

surgeon on-call who recommended ERCP which I agree and pending transfer.  Bey 

pain his improved with pain medication.  Patient afebrile with no white cell 

count at this time and I feel due to severely to markedly pancreatitis transfer 

needs to happen today


06/27/18 10:22


Patient accepted at [facility], Dr. Acuña. Unsure of when medsurge med 

available, called 4 different hospitals, no beds at any. Discussed case with 

surgeon on call, he will contact local GI to see if they can assist and keep 

patient local, although GI not on call.


06/27/18 11:20


Dr. Webber spoke to Dr. Armenta who will perform EGD patient will be kept and 

[facility] (ESTEPHANIE VALDES)





- Vital Signs


Vital signs: 


 











Temp Pulse Resp BP Pulse Ox


 


 98.5 F   96   14   118/66   95 


 


 06/28/18 03:05  06/28/18 03:05  06/28/18 03:05  06/28/18 03:05  06/28/18 03:05














- Laboratory


Laboratory results interpreted by me: 


 











  06/27/18 06/27/18





  06:35 06:35


 


RDW  17.8 H 


 


Potassium   3.2 L


 


Est GFR (Non-Af Amer)   55 L


 


Glucose   210 H


 


Total Bilirubin   7.2 H


 


Direct Bilirubin   5.1 H


 


AST   524 H


 


ALT   289 H


 


Alkaline Phosphatase   382 H


 


Total Protein   8.9 H


 


Albumin   5.1 H


 


Lipase   32194.9 H














Discharge





<DHARA HEARD - Last Filed: 06/27/18 11:05>





- Discharge


Admitting Provider: Lawanda


Unit Admitted: Medical Floor





<ESTEPHANIE VALDES - Last Filed: 06/28/18 06:24>





- Discharge


Clinical Impression: 


 Bile duct obstruction





Condition: Fair


Disposition: ADMITTED AS OBSERVATION


Scribe Attestation: 





06/28/18 06:24


I personally performed the services described documentation, reviewed and 

edited the documentation which was dictated to describe my presence, and it 

accurately records my words and actions. (ESTEPHANIE VALDES)





Scribe Documentation





- Scribe


Written by Dharmesh:: Dharmesh Carrillo, 6/27/2018 0827


acting as scribe for :: Ketan





<DHARA HEARD - Last Filed: 06/27/18 11:05>

## 2018-06-28 LAB
ALBUMIN SERPL-MCNC: 3.9 G/DL (ref 3.5–5)
ALP SERPL-CCNC: 281 U/L (ref 38–126)
ALT SERPL-CCNC: 225 U/L (ref 9–52)
ANION GAP SERPL CALC-SCNC: 15 MMOL/L (ref 5–19)
AST SERPL-CCNC: 252 U/L (ref 14–36)
BILIRUB DIRECT SERPL-MCNC: 1.7 MG/DL (ref 0–0.4)
BILIRUB SERPL-MCNC: 3.3 MG/DL (ref 0.2–1.3)
BUN SERPL-MCNC: 15 MG/DL (ref 7–20)
CALCIUM: 8.9 MG/DL (ref 8.4–10.2)
CHLORIDE SERPL-SCNC: 108 MMOL/L (ref 98–107)
CO2 SERPL-SCNC: 23 MMOL/L (ref 22–30)
GLUCOSE SERPL-MCNC: 98 MG/DL (ref 75–110)
INR PPP: 1.02
LIPASE SERPL-CCNC: 2988.5 U/L (ref 23–300)
POTASSIUM SERPL-SCNC: 4 MMOL/L (ref 3.6–5)
PROT SERPL-MCNC: 6.6 G/DL (ref 6.3–8.2)
PROTHROMBIN TIME: 13.9 SEC (ref 11.4–15.4)
SODIUM SERPL-SCNC: 145.5 MMOL/L (ref 137–145)

## 2018-06-28 PROCEDURE — BF101ZZ FLUOROSCOPY OF BILE DUCTS USING LOW OSMOLAR CONTRAST: ICD-10-PCS | Performed by: INTERNAL MEDICINE

## 2018-06-28 PROCEDURE — 0F798ZZ DILATION OF COMMON BILE DUCT, VIA NATURAL OR ARTIFICIAL OPENING ENDOSCOPIC: ICD-10-PCS | Performed by: INTERNAL MEDICINE

## 2018-06-28 RX ADMIN — HYDROXYZINE HYDROCHLORIDE SCH MG: 10 TABLET, FILM COATED ORAL at 11:28

## 2018-06-28 RX ADMIN — PIPERACILLIN AND TAZOBACTAM SCH MLS: 3; .375 INJECTION, POWDER, LYOPHILIZED, FOR SOLUTION INTRAVENOUS; PARENTERAL at 18:07

## 2018-06-28 RX ADMIN — MORPHINE SULFATE PRN MG: 10 INJECTION INTRAMUSCULAR; INTRAVENOUS; SUBCUTANEOUS at 10:41

## 2018-06-28 RX ADMIN — PROMETHAZINE HYDROCHLORIDE PRN MG: 25 INJECTION INTRAMUSCULAR; INTRAVENOUS at 09:33

## 2018-06-28 RX ADMIN — PROMETHAZINE HYDROCHLORIDE PRN MG: 25 INJECTION INTRAMUSCULAR; INTRAVENOUS at 20:54

## 2018-06-28 RX ADMIN — MORPHINE SULFATE PRN MG: 10 INJECTION INTRAMUSCULAR; INTRAVENOUS; SUBCUTANEOUS at 20:54

## 2018-06-28 RX ADMIN — PIPERACILLIN AND TAZOBACTAM SCH GM: 3; .375 INJECTION, POWDER, LYOPHILIZED, FOR SOLUTION INTRAVENOUS; PARENTERAL at 06:05

## 2018-06-28 RX ADMIN — SODIUM CHLORIDE PRN ML: 9 INJECTION, SOLUTION INTRAVENOUS at 06:05

## 2018-06-28 RX ADMIN — PIPERACILLIN AND TAZOBACTAM SCH GM: 3; .375 INJECTION, POWDER, LYOPHILIZED, FOR SOLUTION INTRAVENOUS; PARENTERAL at 11:52

## 2018-06-28 RX ADMIN — MORPHINE SULFATE PRN MG: 10 INJECTION INTRAMUSCULAR; INTRAVENOUS; SUBCUTANEOUS at 06:18

## 2018-06-28 RX ADMIN — SODIUM CHLORIDE PRN ML: 9 INJECTION, SOLUTION INTRAVENOUS at 13:44

## 2018-06-28 RX ADMIN — PIPERACILLIN AND TAZOBACTAM SCH GM: 3; .375 INJECTION, POWDER, LYOPHILIZED, FOR SOLUTION INTRAVENOUS; PARENTERAL at 00:34

## 2018-06-28 RX ADMIN — HYDROXYZINE HYDROCHLORIDE SCH MG: 10 TABLET, FILM COATED ORAL at 20:03

## 2018-06-28 NOTE — OPERATIVE REPORT
Operative Report


DATE OF SURGERY: 06/28/18


Operative Report: 





Pre-op diagnosis: Gallstone pancreatitis with jaundice





Post-op diagnosis:


1.  Common bile duct stone


2.  Dilated biliary tree





Surgery: ERCP with sphincterotomy and balloon stone extraction





Medications: As per anesthesia





Tissue removed: None





Procedure: After informed consent obtained from patient, the throat was sprayed 

with Hurricane and conscious sedation was achieved.  The ERCP endoscope was 

then inserted into the esophagus blindly and advanced into the stomach.  The 

duodenum was entered and the ampulla was identified.  Using the triple-lumen 

sphincterotomy catheter the common bile duct was freely cannulated.  A 

cholangiogram was obtained which showed possible filling defect in the distal 

common bile duct.  The common bile duct and intrahepatic ducts did appear 

dilated.  A good sized sphincterotomy was then performed using the endocut 

mode.  The catheter was removed over the guidewire before a 9-12 mm balloon 

catheter was inserted.  The balloon was inflated to 12 mm in the proximal 

common bile duct and pulled down the duct.  A small yellow multifaceted stone 

was extracted.  The duct was swept one more time.  A balloon occlusion 

cholangiogram was normal.  The pancreatic duct was intentionally not 

cannulated.  Patient tolerated procedure well.





Findings





Common bile duct: Dilated.  Small yellow stone extracted


Intrahepatic ducts: Normal


Pancreatic duct: Not cannulated








Plan: Follow liver function tests and start clear liquids in the morning if 

tolerated.


OPERATION: .

## 2018-06-28 NOTE — PDOC PROGRESS REPORT
Subjective


Progress Note for:: 06/28/18


Subjective:: 





This is a 63-year-old female with common bile duct obstruction, presumably from 

a retained common bile duct stone.  She is several weeks status post 

cholecystectomy.  Her bilirubin was 7 upon arrival.  She also has elevation of 

her amylase and lipase, consistent with biliary pancreatitis.  Patient 

complains of itching, nausea, abdominal pain, fatigue, malaise, and headache 

today.  She denies any shortness of breath, dizziness, orthostasis, or chest 

pain.


Reason For Visit: 


BILE DUCT OBSTRUCTION








Physical Exam


Vital Signs: 


 











Temp Pulse Resp BP Pulse Ox


 


 98.5 F   93   16   159/81 H  100 


 


 06/28/18 15:38  06/28/18 15:38  06/28/18 15:38  06/28/18 15:38  06/28/18 15:38








 Intake & Output











 06/27/18 06/28/18 06/29/18





 06:59 06:59 06:59


 


Output Total  1600 


 


Balance  -1600 











General appearance: PRESENT: mild distress


Head exam: PRESENT: atraumatic, normocephalic


Eye exam: PRESENT: EOMI, PERRLA


Mouth exam: PRESENT: neck supple


Neck exam: ABSENT: tenderness, thyromegaly, tracheal deviation


Respiratory exam: PRESENT: clear to auscultation todd.  ABSENT: accessory muscle 

use


Cardiovascular exam: PRESENT: RRR


Pulses: PRESENT: normal radial pulses


Vascular exam: PRESENT: normal capillary refill.  ABSENT: pallor


GI/Abdominal exam: PRESENT: soft, tenderness - Epigastric


Rectal exam: PRESENT: deferred


Extremities exam: ABSENT: clubbing


Neurological exam: PRESENT: alert, awake, oriented to person, oriented to place

, oriented to time, oriented to situation, CN II-XII grossly intact.  ABSENT: 

motor sensory deficit


Psychiatric exam: PRESENT: anxious.  ABSENT: agitated


Skin exam: PRESENT: jaundice.  ABSENT: cyanosis





Results


Laboratory Results: 


 





 06/28/18 06:35 





 











  06/28/18 06/28/18





  06:35 06:35


 


Sodium   145.5 H


 


Potassium   4.0


 


Chloride   108 H


 


Carbon Dioxide   23


 


Anion Gap   15


 


BUN   15


 


Creatinine   1.29 H


 


Est GFR ( Amer)   51 L


 


Est GFR (Non-Af Amer)   42 L


 


Glucose   98


 


Calcium   8.9


 


Total Bilirubin  3.3 H D 


 


AST  252 H 


 


ALT  225 H 


 


Alkaline Phosphatase  281 H 


 


Total Protein  6.6 


 


Albumin  3.9 


 


Lipase  2988.5 H 











Impressions: 


 





Abdomen Ultrasound  06/27/18 06:48


IMPRESSION:  1.  Status post cholecystectomy.  No regional fluid collections.  

There is duct prominence.  This includes intrahepatic, common and pancreatic.  

No duct stones are appreciated as assessed.


 














Assessment & Plan





- Diagnosis


(1) Biliary acute pancreatitis


Qualifiers: 


   Acute pancreatitis complication: no infection or necrosis   Qualified Code(s)

: K85.10 - Biliary acute pancreatitis without necrosis or infection   


Is this a current diagnosis for this admission?: Yes   





(2) Biliary obstruction


Is this a current diagnosis for this admission?: Yes   





- Plan Summary


Plan Summary: 





This is a 63-year-old female admitted with biliary obstruction and biliary 

pancreatitis.  I believe she has retained common bile duct stone.  She is 

scheduled for ERCP today.  Hopefully this will resolve her symptoms.  Continue 

supportive care.  Await ERCP results.

## 2018-06-29 LAB
ALBUMIN SERPL-MCNC: 3.6 G/DL (ref 3.5–5)
ALP SERPL-CCNC: 230 U/L (ref 38–126)
ALT SERPL-CCNC: 171 U/L (ref 9–52)
ANION GAP SERPL CALC-SCNC: 13 MMOL/L (ref 5–19)
AST SERPL-CCNC: 132 U/L (ref 14–36)
BILIRUB DIRECT SERPL-MCNC: 1 MG/DL (ref 0–0.4)
BILIRUB SERPL-MCNC: 1.7 MG/DL (ref 0.2–1.3)
BUN SERPL-MCNC: 14 MG/DL (ref 7–20)
CALCIUM: 9.1 MG/DL (ref 8.4–10.2)
CHLORIDE SERPL-SCNC: 111 MMOL/L (ref 98–107)
CO2 SERPL-SCNC: 26 MMOL/L (ref 22–30)
GLUCOSE SERPL-MCNC: 112 MG/DL (ref 75–110)
LIPASE SERPL-CCNC: 171.1 U/L (ref 23–300)
POTASSIUM SERPL-SCNC: 4.7 MMOL/L (ref 3.6–5)
PROT SERPL-MCNC: 6.4 G/DL (ref 6.3–8.2)
SODIUM SERPL-SCNC: 150.4 MMOL/L (ref 137–145)

## 2018-06-29 RX ADMIN — ACETAMINOPHEN PRN MG: 325 TABLET ORAL at 09:49

## 2018-06-29 RX ADMIN — HYDROXYZINE HYDROCHLORIDE SCH MG: 10 TABLET, FILM COATED ORAL at 09:49

## 2018-06-29 RX ADMIN — PIPERACILLIN AND TAZOBACTAM SCH GM: 3; .375 INJECTION, POWDER, LYOPHILIZED, FOR SOLUTION INTRAVENOUS; PARENTERAL at 05:10

## 2018-06-29 RX ADMIN — PIPERACILLIN AND TAZOBACTAM SCH GM: 3; .375 INJECTION, POWDER, LYOPHILIZED, FOR SOLUTION INTRAVENOUS; PARENTERAL at 17:41

## 2018-06-29 RX ADMIN — PIPERACILLIN AND TAZOBACTAM SCH GM: 3; .375 INJECTION, POWDER, LYOPHILIZED, FOR SOLUTION INTRAVENOUS; PARENTERAL at 11:13

## 2018-06-29 RX ADMIN — HYDROCHLOROTHIAZIDE SCH MG: 25 TABLET ORAL at 09:49

## 2018-06-29 RX ADMIN — METOPROLOL TARTRATE SCH MG: 25 TABLET, FILM COATED ORAL at 09:49

## 2018-06-29 RX ADMIN — MULTIVITAMIN TABLET SCH TAB: TABLET at 09:49

## 2018-06-29 RX ADMIN — PIPERACILLIN AND TAZOBACTAM SCH GM: 3; .375 INJECTION, POWDER, LYOPHILIZED, FOR SOLUTION INTRAVENOUS; PARENTERAL at 23:13

## 2018-06-29 RX ADMIN — ACETAMINOPHEN PRN MG: 325 TABLET ORAL at 20:59

## 2018-06-29 RX ADMIN — HYDROXYZINE HYDROCHLORIDE SCH MG: 10 TABLET, FILM COATED ORAL at 17:41

## 2018-06-29 RX ADMIN — METOPROLOL TARTRATE SCH MG: 25 TABLET, FILM COATED ORAL at 20:59

## 2018-06-29 RX ADMIN — PIPERACILLIN AND TAZOBACTAM SCH GM: 3; .375 INJECTION, POWDER, LYOPHILIZED, FOR SOLUTION INTRAVENOUS; PARENTERAL at 01:25

## 2018-06-29 NOTE — RADIOLOGY REPORT (SQ)
EXAM DESCRIPTION:  ENDO CATH/BILIARY DUCT; NO CHG FLUORO



COMPLETED DATE/TIME:  6/28/2018 7:15 pm



REASON FOR STUDY:  ERCP



COMPARISON:  Right upper quadrant ultrasound 5/20/2018, 6/27/2018

CT abdomen pelvis 6/20/2018



FLUOROSCOPY TIME:  1.7 minutes

7 digital radiographic images saved to PACS.



TECHNIQUE:  Intra-operative images acquired during surgical procedure to evaluate progress.

NUMBER OF IMAGES: 7 digital radiographic images saved to pac's.



LIMITATIONS:  None.



FINDINGS:  Intra procedural C-arm images during ERCP.  Images demonstrate injection of contrast into 
the distal common duct and filling defects worrisome for distal common duct stones.

Common hepatic duct is same caliber as the endoscope, indicating biliary ductal dilatation.

Final films demonstrate sweeping of the common bile duct with a balloon tipped catheter.

Clips post cholecystectomy



IMPRESSION:  Intra procedural imaging and fluoro during ERCP.  Please see the operative report for fu
rther details



COMMENT:  Quality :  Final reports for procedures using fluoroscopy that document radiation exp
osure indices, or exposure time and number of fluorographic images (if radiation exposure indices are
 not available)

Please consult full operative report of the attending physician for description of the procedure.



TECHNICAL DOCUMENTATION:  JOB ID:  [id number]

 2011 [employer]- All Rights Reserved



Reading location - IP/workstation name: [email or web address]

## 2018-06-29 NOTE — RADIOLOGY REPORT (SQ)
EXAM DESCRIPTION:  ENDO CATH/BILIARY DUCT; NO CHG FLUORO



COMPLETED DATE/TIME:  6/28/2018 7:15 pm



REASON FOR STUDY:  ERCP



COMPARISON:  Right upper quadrant ultrasound 5/20/2018, 6/27/2018

CT abdomen pelvis 6/20/2018



FLUOROSCOPY TIME:  1.7 minutes

7 digital radiographic images saved to PACS.



TECHNIQUE:  Intra-operative images acquired during surgical procedure to evaluate progress.

NUMBER OF IMAGES: 7 digital radiographic images saved to pac's.



LIMITATIONS:  None.



FINDINGS:  Intra procedural C-arm images during ERCP.  Images demonstrate injection of contrast into 
the distal common duct and filling defects worrisome for distal common duct stones.

Common hepatic duct is same caliber as the endoscope, indicating biliary ductal dilatation.

Final films demonstrate sweeping of the common bile duct with a balloon tipped catheter.

Clips post cholecystectomy



IMPRESSION:  Intra procedural imaging and fluoro during ERCP.  Please see the operative report for fu
rther details



COMMENT:  Quality :  Final reports for procedures using fluoroscopy that document radiation exp
osure indices, or exposure time and number of fluorographic images (if radiation exposure indices are
 not available)

Please consult full operative report of the attending physician for description of the procedure.



TECHNICAL DOCUMENTATION:  JOB ID:  [id number]

 2011 [employer]- All Rights Reserved



Reading location - IP/workstation name: [email or web address]

## 2018-06-29 NOTE — PDOC PROGRESS REPORT
Subjective


Progress Note for:: 06/29/18


Subjective:: 





Dramatic decrese in abdominal pains post ERCP and removal of CBD stone.


Reason For Visit: 


BILE DUCT OBSTRUCTION,GALLSTONE PANCREATITIS








Physical Exam


Vital Signs: 


 











Temp Pulse Resp BP Pulse Ox


 


 99.5 F   94   16   135/69 H  99 


 


 06/29/18 19:50  06/29/18 19:50  06/29/18 19:50  06/29/18 19:50  06/29/18 19:50








 Intake & Output











 06/28/18 06/29/18 06/30/18





 06:59 06:59 06:59


 


Intake Total  5825 684


 


Output Total 1600 2200 


 


Balance -1600 3625 684


 


Weight  93.3 kg 











Exam: 





abd is soft and non tender





Results


Laboratory Results: 


 





 06/29/18 04:36 





 











  06/29/18





  04:36


 


Sodium  150.4 H


 


Potassium  4.7


 


Chloride  111 H


 


Carbon Dioxide  26


 


Anion Gap  13


 


BUN  14


 


Creatinine  1.09


 


Est GFR ( Amer)  > 60


 


Est GFR (Non-Af Amer)  51 L


 


Glucose  112 H


 


Calcium  9.1


 


Total Bilirubin  1.7 H


 


AST  132 H


 


ALT  171 H


 


Alkaline Phosphatase  230 H


 


Total Protein  6.4


 


Albumin  3.6


 


Lipase  171.1











Impressions: 


 





Abdomen Ultrasound  06/27/18 06:48


IMPRESSION:  1.  Status post cholecystectomy.  No regional fluid collections.  

There is duct prominence.  This includes intrahepatic, common and pancreatic.  

No duct stones are appreciated as assessed.


 








Catheter Placement  06/28/18 00:00


IMPRESSION:  Intra procedural imaging and fluoro during ERCP.  Please see the 

operative report for further details


 








Fluoroscopy  06/28/18 00:00


IMPRESSION:  Intra procedural imaging and fluoro during ERCP.  Please see the 

operative report for further details


 














Assessment & Plan





- Diagnosis


(1) Elevated liver function tests


Is this a current diagnosis for this admission?: Yes   





(2) Elevated lipase


Is this a current diagnosis for this admission?: Yes   





- Time


Time Spent with patient: 15-24 minutes





- Plan Summary


Plan Summary: 





Recheck LFT's in am prior to discharge.


Continue soft diet and antibiotics

## 2018-06-30 VITALS — SYSTOLIC BLOOD PRESSURE: 161 MMHG | DIASTOLIC BLOOD PRESSURE: 81 MMHG

## 2018-06-30 LAB
ALBUMIN SERPL-MCNC: 3.5 G/DL (ref 3.5–5)
ALP SERPL-CCNC: 192 U/L (ref 38–126)
ALT SERPL-CCNC: 119 U/L (ref 9–52)
ANION GAP SERPL CALC-SCNC: 13 MMOL/L (ref 5–19)
AST SERPL-CCNC: 61 U/L (ref 14–36)
BILIRUB DIRECT SERPL-MCNC: 0.8 MG/DL (ref 0–0.4)
BILIRUB SERPL-MCNC: 1.2 MG/DL (ref 0.2–1.3)
BUN SERPL-MCNC: 11 MG/DL (ref 7–20)
CALCIUM: 8.7 MG/DL (ref 8.4–10.2)
CHLORIDE SERPL-SCNC: 107 MMOL/L (ref 98–107)
CO2 SERPL-SCNC: 27 MMOL/L (ref 22–30)
GLUCOSE SERPL-MCNC: 93 MG/DL (ref 75–110)
POTASSIUM SERPL-SCNC: 3.7 MMOL/L (ref 3.6–5)
PROT SERPL-MCNC: 6.1 G/DL (ref 6.3–8.2)
SODIUM SERPL-SCNC: 147.1 MMOL/L (ref 137–145)

## 2018-06-30 RX ADMIN — HYDROXYZINE HYDROCHLORIDE SCH MG: 10 TABLET, FILM COATED ORAL at 09:27

## 2018-06-30 RX ADMIN — MULTIVITAMIN TABLET SCH TAB: TABLET at 09:28

## 2018-06-30 RX ADMIN — HYDROCHLOROTHIAZIDE SCH MG: 25 TABLET ORAL at 09:26

## 2018-06-30 RX ADMIN — METOPROLOL TARTRATE SCH MG: 25 TABLET, FILM COATED ORAL at 09:27

## 2018-06-30 RX ADMIN — PIPERACILLIN AND TAZOBACTAM SCH GM: 3; .375 INJECTION, POWDER, LYOPHILIZED, FOR SOLUTION INTRAVENOUS; PARENTERAL at 06:07

## 2018-07-01 NOTE — DISCHARGE SUMMARY E
Discharge Summary



NAME: CARLOS LO

MRN:  [id number]        : 1955     AGE: 63Y

ADMITTED: 2018                  DISCHARGED: 2018



FINAL DIAGNOSIS:

RETAINED COMMON BILE DUCT STONE.



PROCEDURE:

Endoscopic retrograde cholangiopancreatography with sphincterotomy done by

Dr. Armenta on 2018.



HOSPITAL COURSE:

This is a 63-year-old female, who underwent laparoscopic cholecystectomy

around 3-4 weeks ago.  Postoperatively, the patient complained of off and

on right upper quadrant pains.  She was seen in the emergency room on

2018 and her LFTs were noted to be elevated.  She was then admitted

and placed on IV antibiotics and Dr. Armenta subsequently did ERCP with

sphincterotomy on 2018.  Postoperatively, her liver functions

gradually improved as far as the lipase.  The lipase came back to normal

the day after the ERCP and LFTs are almost back to normal on the day of

discharge.  The patient tolerating regular diet well on discharge.



The patient would like to follow up with Dr. Armenta.





DICTATING PHYSICIAN:  BRENT CEDILLO M.D.





[id number]                  DT: 2018    0446

PHY#: [id number]            DD: 2018    1056

ID:   [id number]           JOB#: [id number]       ACCT: [id number]



cc:BRNET CEDILLO M.D.

>

## 2018-07-12 NOTE — CONSULTATION REPORT E
Consultation Report



NAME: CARLOS LO

MRN:  [id number]               : 1955      AGE: 63Y

DATE: 2018    532  A



TO:   ELLIE GARRETT M.D.



FROM: BRENT CEDILLO M.D.

      Requesting Physician



HISTORY OF PRESENT ILLNESS:

This is a 63-year-old patient admitted with abdominal pain, jaundice, and

pancreatitis.  Her lipase was 28,000.  She has been having recurrent

abdominal pain for at least the last 7-10 days.  She was seen in the

Emergency Room on 2018 when her lipase was 67.  There was no LFT

done at that time.  She had a CT scan of her abdomen on 2018 that

was unremarkable.  On admission this time, an ultrasound was performed and

this showed prominence of the biliary tree but no stones were identified. 

She had a cholecystectomy in May of this year.  She has no previous

history of liver disease or pancreatitis.



PAST MEDICAL HISTORY:

1.  Hypertension.

2.  COPD.

3.  Arthritis.



PAST SURGICAL HISTORY:

1.  Cholecystectomy.

2.  Hysterectomy.



ALLERGIES:

None.



SOCIAL HISTORY:

Noncontributory.



REVIEW OF SYSTEMS:

Other than the above, is noncontributory.



PHYSICAL EXAMINATION:

GENERAL:  Shows a lady in no distress.  She is jaundiced.



HEENT:  No pallor but she is jaundiced.  Oropharynx normal.



NECK:  No bruit.  No JVD.



CHEST:  No deformity.



LUNGS:  Clear.



ABDOMEN:  Soft and tender in the epigastrium and the right upper quadrant.

Liver and spleen not palpable.  Bowel sounds normal.



NEUROLOGIC:  Grossly nonfocal.



EXTREMITIES:  No edema.



DIAGNOSTICS:

Her laboratory tests showed a bilirubin of 7, *------* of 5, , ALT

289, alkaline phosphatase 282, lipase of 28,000.  CBC was normal.  INR was

1.02.



ASSESSMENT AND PLAN:

Acute pancreatitis and jaundice.  I suspect she has a retained common bile

duct stone.  The need for an ERCP was explained to the patient and she is

in agreement.  She will continue with pain control and IV fluids for now

and hopefully, we can do the ERCP the next day.







DICTATING PHYSICIAN: ELLIE GARRETT M.D.





[id number]                  DT: 2018    2143

Y#: [id number]            DD: 2018    1744

ID:   [id number]           JOB#: [id number]      ACCT: [id number]



cc:ELLIE GARRETT M.D.

>

## 2018-07-17 ENCOUNTER — HOSPITAL ENCOUNTER (OUTPATIENT)
Dept: HOSPITAL 62 - RAD | Age: 63
End: 2018-07-17
Attending: INTERNAL MEDICINE
Payer: COMMERCIAL

## 2018-07-17 DIAGNOSIS — K85.10: Primary | ICD-10-CM

## 2018-07-17 DIAGNOSIS — R10.13: ICD-10-CM

## 2018-07-17 PROCEDURE — 74170 CT ABD WO CNTRST FLWD CNTRST: CPT

## 2018-07-17 PROCEDURE — 82565 ASSAY OF CREATININE: CPT

## 2018-07-17 NOTE — RADIOLOGY REPORT (SQ)
EXAM DESCRIPTION:  CT ABDOMEN COMBO



COMPLETED DATE/TIME:  7/17/2018 2:17 pm



REASON FOR STUDY:  R10.13 EPIGASTRIC PAIN K85.10 BILIARY ACUTE PANCREATITIS WITHOUT NECROSIS O R10.13
  EPIGASTRIC PAIN K85.10  BILIARY ACUTE PANCREATITIS WITHOUT NECROSIS OR INFECT



COMPARISON:  Abdominal ultrasound 5/20/2018

CT abdomen pelvis 6/20/2018



TECHNIQUE:  CT scan of the abdomen performed with and without intravenous contrast, and without oral 
contrast. Contrasted imaging performed using helical scanning technique with dynamic intravenous cont
rast injection. Images reviewed with lung, soft tissue, and bone windows. Reconstructed coronal and s
agittal MPR images reviewed. Delayed images for evaluation of the urinary system also acquired and ev
aluated. All images stored on PACS.

All CT scanners at this facility use dose modulation, iterative reconstruction, and/or weight based d
osing when appropriate to reduce radiation dose to as low as reasonably achievable (ALARA).

[facility]: Dose Right  [facility]: CareDose    [facility]: Dose Right    [facility]: Teradose 4D    [facility]: Smart Technologies



CONTRAST TYPE AND DOSE:  contrast/concentration: Isovue 370.00 mg/ml; Total Contrast Delivered: 56.0 
ml; Total Saline Delivered: 80.0 ml



RENAL FUNCTION:  Creatinine 1.0



RADIATION DOSE:  CT Rad equipment meets quality standard of care and radiation dose reduction techniq
ues were employed. CTDIvol: 15.1 - 19.7 mGy. DLP: 2393 mGy-cm..



LIMITATIONS:  None.



FINDINGS:  NONCONTRASTED IMAGING: No significant renal or bladder calcifications. No other significan
t organ calcifications.

POSTCONTRASTED IMAGING:

LOWER CHEST: No significant findings. No nodules or infiltrates.

LIVER: Normal size. No masses.  No dilated ducts.

SPLEEN: Normal size. No focal lesions.

PANCREAS: No masses. No significant calcifications. No adjacent inflammation or peripancreatic fluid 
collections. Pancreatic duct not dilated.

GALLBLADDER: Surgically absent

ADRENAL GLANDS: No significant masses or asymmetry.

RIGHT KIDNEY AND URETER: No solid masses.   No significant calcifications.   No hydronephrosis or hyd
roureter.

LEFT KIDNEY AND URETER: No solid masses.   No significant calcifications.   No hydronephrosis or hydr
oureter.

AORTA AND VESSELS: No aneurysm. No dissection. Renal arteries, SMA, celiac without stenosis.

RETROPERITONEUM: No retroperitoneal adenopathy, hemorrhage or masses.

BOWEL AND PERITONEAL CAVITY: No free intraperitoneal air or fluid in the field of view.  No oral cont
rast.  Scattered colonic diverticuli without CT signs of acute diverticulitis.  No CT evidence of bow
el obstruction.

APPENDIX: Not in the field of view

ABDOMINAL WALL: No masses. No hernias.

BONES: No significant or acute findings.

OTHER: No other significant finding.



IMPRESSION:  No CT evidence of peripancreatic inflammation or pseudocyst.  No primary pancreatic nodu
le or mass.

Post cholecystectomy

Colonic diverticulosis without CT evidence of acute diverticulitis



TECHNICAL DOCUMENTATION:  JOB ID:  [id number]

Quality ID # 436: Final reports with documentation of one or more dose reduction techniques (e.g., Au
tomated exposure control, adjustment of the mA and/or kV according to patient size, use of iterative 
reconstruction technique)

 2011 [employer]- All Rights Reserved



Reading location - IP/workstation name: [email or web address]

## 2018-09-28 ENCOUNTER — HOSPITAL ENCOUNTER (OUTPATIENT)
Dept: HOSPITAL 62 - WI | Age: 63
End: 2018-09-28
Attending: FAMILY MEDICINE
Payer: COMMERCIAL

## 2018-09-28 DIAGNOSIS — Z12.31: Primary | ICD-10-CM

## 2018-09-28 PROCEDURE — 77067 SCR MAMMO BI INCL CAD: CPT

## 2018-09-28 NOTE — WOMENS IMAGING REPORT
EXAM DESCRIPTION:  BILAT SCREENING MAMMO W/CAD



COMPLETED DATE/TIME:  9/28/2018 10:03 am



REASON FOR STUDY:  BILATERAL SCREENING MAMMO/Z12.31 Z12.31  ENCNTR SCREEN MAMMOGRAM FOR MALIGNANT JOY
PLASM OF CATHLEEN



COMPARISON:  10/29/2016.



TECHNIQUE:  Standard craniocaudal and mediolateral oblique views of each breast recorded using [employer]
l acquisition.



LIMITATIONS:  None.



FINDINGS:  Findings present which are benign by mammographic criteria.  No suspicious masses, calcifi
cations or architectural distortion.

Pertinent benign findings: Stable benign calcifications.  Small circumscribed masses in the lateral l
eft breast.

Read with the assistance of CAD.

.[facility] - R2 Cenova Version 1.3

.[facility] - R2 Cenova Version 1.3

.[facility] - R2 Cenova Version 2.4

.[facility] [place] [facility] - R2 Cenova Version 2.4

.[facility] - R2  Version 9.2

Benign mammographic findings may include one or more of the following:  Smooth masses, popcorn/rim/co
arse calcifications, asymmetries, post-procedure changes, and lesions with long-standing stability.



IMPRESSION:  BENIGN MAMMOGRAPHIC FINDINGS.  BIRADS 2



BREAST DENSITY:  c. The breasts are heterogeneously dense, which may obscure small masses.



BIRAD:  2 BENIGN FINDING(S)



RECOMMENDATION:  ROUTINE SCREENING



COMMENT:  The patient has been notified of the results by letter per SA requirements. Additional no
tification policies are in place for contacting patient with suspicious or incomplete findings.

Quality ID #225: The American College of Radiology recommends an annual screening mammogram for women
 aged 40 years or over. This facility utilizes a reminder system to ensure that all patients receive 
reminder letters, and/or direct phone calls for appointments. This includes reminders for routine scr
eening mammograms, diagnostic mammograms, or other Breast Imaging Interventions when appropriate.  Th
is patient will be placed in the appropriate reminder system.

The American College of Radiology (ACR) has developed recommendations for screening MRI of the breast
s in certain patient populations, to be used in conjunction with mammography.  Breast MRI surveillanc
e may be appropriate for women with more than 20% lifetime risk of developing breast cancer  as deter
mined by genetic testing, significant family history of the disease, or history of mantle radiation f
or Hodgkins Disease.  ACR Practice Guidelines 2008.



TECHNICAL DOCUMENTATION:  FINDING NUMBER: (1)

ASSESSMENT: (1)

JOB ID:  [id number]

 2011 [employer]- All Rights Reserved



Reading location - IP/workstation name: [email or web address]

## 2018-10-05 ENCOUNTER — HOSPITAL ENCOUNTER (OUTPATIENT)
Dept: HOSPITAL 62 - OD | Age: 63
End: 2018-10-05
Attending: FAMILY MEDICINE
Payer: COMMERCIAL

## 2018-10-05 DIAGNOSIS — R73.01: Primary | ICD-10-CM

## 2018-10-05 DIAGNOSIS — I10: ICD-10-CM

## 2018-10-05 DIAGNOSIS — M79.7: ICD-10-CM

## 2018-10-05 DIAGNOSIS — Z79.899: ICD-10-CM

## 2018-10-05 LAB
ADD MANUAL DIFF: NO
ANION GAP SERPL CALC-SCNC: 12 MMOL/L (ref 5–19)
BASOPHILS # BLD AUTO: 0 10^3/UL (ref 0–0.2)
BASOPHILS NFR BLD AUTO: 0.5 % (ref 0–2)
BUN SERPL-MCNC: 13 MG/DL (ref 7–20)
CALCIUM: 9.3 MG/DL (ref 8.4–10.2)
CHLORIDE SERPL-SCNC: 104 MMOL/L (ref 98–107)
CO2 SERPL-SCNC: 28 MMOL/L (ref 22–30)
EOSINOPHIL # BLD AUTO: 0.3 10^3/UL (ref 0–0.6)
EOSINOPHIL NFR BLD AUTO: 5.8 % (ref 0–6)
ERYTHROCYTE [DISTWIDTH] IN BLOOD BY AUTOMATED COUNT: 16.8 % (ref 11.5–14)
GLUCOSE SERPL-MCNC: 107 MG/DL (ref 75–110)
HCT VFR BLD CALC: 33.7 % (ref 36–47)
HGB BLD-MCNC: 12 G/DL (ref 12–15.5)
LYMPHOCYTES # BLD AUTO: 1.3 10^3/UL (ref 0.5–4.7)
LYMPHOCYTES NFR BLD AUTO: 26.6 % (ref 13–45)
MCH RBC QN AUTO: 28.2 PG (ref 27–33.4)
MCHC RBC AUTO-ENTMCNC: 35.7 G/DL (ref 32–36)
MCV RBC AUTO: 79 FL (ref 80–97)
MONOCYTES # BLD AUTO: 0.4 10^3/UL (ref 0.1–1.4)
MONOCYTES NFR BLD AUTO: 8.1 % (ref 3–13)
NEUTROPHILS # BLD AUTO: 3 10^3/UL (ref 1.7–8.2)
NEUTS SEG NFR BLD AUTO: 59 % (ref 42–78)
PLATELET # BLD: 281 10^3/UL (ref 150–450)
POTASSIUM SERPL-SCNC: 4.6 MMOL/L (ref 3.6–5)
RBC # BLD AUTO: 4.27 10^6/UL (ref 3.72–5.28)
SODIUM SERPL-SCNC: 144.3 MMOL/L (ref 137–145)
TOTAL CELLS COUNTED % (AUTO): 100 %
WBC # BLD AUTO: 5 10^3/UL (ref 4–10.5)

## 2018-10-05 PROCEDURE — 36415 COLL VENOUS BLD VENIPUNCTURE: CPT

## 2018-10-05 PROCEDURE — 83036 HEMOGLOBIN GLYCOSYLATED A1C: CPT

## 2018-10-05 PROCEDURE — 80048 BASIC METABOLIC PNL TOTAL CA: CPT

## 2018-10-05 PROCEDURE — 85025 COMPLETE CBC W/AUTO DIFF WBC: CPT

## 2018-11-16 ENCOUNTER — HOSPITAL ENCOUNTER (OUTPATIENT)
Dept: HOSPITAL 62 - OD | Age: 63
End: 2018-11-16
Attending: FAMILY MEDICINE
Payer: COMMERCIAL

## 2018-11-16 DIAGNOSIS — R05: Primary | ICD-10-CM

## 2018-11-16 DIAGNOSIS — R07.9: ICD-10-CM

## 2018-11-16 PROCEDURE — 71046 X-RAY EXAM CHEST 2 VIEWS: CPT

## 2018-11-16 NOTE — RADIOLOGY REPORT (SQ)
EXAM DESCRIPTION:  CHEST PA/LATERAL



COMPLETED DATE/TIME:  11/16/2018 8:43 am



REASON FOR STUDY:  COUGH,CHEST PAIN



COMPARISON:  2016.



TECHNIQUE:  Frontal and lateral radiographic views of the chest acquired.



NUMBER OF VIEWS:  Two view.



LIMITATIONS:  None.



FINDINGS:  LUNGS AND PLEURA: No opacities, masses or pneumothorax. No pleural effusion.

MEDIASTINUM AND HILAR STRUCTURES: No masses or contour abnormalities.

HEART AND VASCULAR STRUCTURES: Heart normal size.  No evidence for failure.

BONES: No acute findings.

HARDWARE: None in the chest.

OTHER: No other significant finding.



IMPRESSION:  NO SIGNIFICANT RADIOGRAPHIC FINDING IN THE CHEST.



TECHNICAL DOCUMENTATION:  JOB ID:  [id number]

 2011 [employer]- All Rights Reserved



Reading location - IP/workstation name: [email or web address]

## 2019-03-11 ENCOUNTER — HOSPITAL ENCOUNTER (OUTPATIENT)
Dept: HOSPITAL 62 - OD | Age: 64
End: 2019-03-11
Attending: FAMILY MEDICINE
Payer: SELF-PAY

## 2019-03-11 DIAGNOSIS — R79.89: Primary | ICD-10-CM

## 2019-03-11 LAB
ANION GAP SERPL CALC-SCNC: 12 MMOL/L (ref 5–19)
BUN SERPL-MCNC: 17 MG/DL (ref 7–20)
CALCIUM: 9.8 MG/DL (ref 8.4–10.2)
CHLORIDE SERPL-SCNC: 102 MMOL/L (ref 98–107)
CO2 SERPL-SCNC: 27 MMOL/L (ref 22–30)
CREAT UR-MCNC: 171.8 MG/DL (ref 15–278)
GLUCOSE SERPL-MCNC: 83 MG/DL (ref 75–110)
POTASSIUM SERPL-SCNC: 5.1 MMOL/L (ref 3.6–5)
PROT UR STRIP-MCNC: 5.1 MG/DL (ref ?–12)
SODIUM SERPL-SCNC: 140.5 MMOL/L (ref 137–145)
UR PRO/CREAT RATIO RESULT: 0 MG/MG (ref 0–0.2)

## 2019-03-11 PROCEDURE — 36415 COLL VENOUS BLD VENIPUNCTURE: CPT

## 2019-03-11 PROCEDURE — 84156 ASSAY OF PROTEIN URINE: CPT

## 2019-03-11 PROCEDURE — 80048 BASIC METABOLIC PNL TOTAL CA: CPT

## 2019-03-11 PROCEDURE — 82570 ASSAY OF URINE CREATININE: CPT

## 2019-03-25 ENCOUNTER — HOSPITAL ENCOUNTER (EMERGENCY)
Dept: HOSPITAL 62 - ER | Age: 64
Discharge: HOME | End: 2019-03-25
Payer: COMMERCIAL

## 2019-03-25 VITALS — DIASTOLIC BLOOD PRESSURE: 85 MMHG | SYSTOLIC BLOOD PRESSURE: 143 MMHG

## 2019-03-25 DIAGNOSIS — I10: ICD-10-CM

## 2019-03-25 DIAGNOSIS — R07.89: Primary | ICD-10-CM

## 2019-03-25 DIAGNOSIS — J44.9: ICD-10-CM

## 2019-03-25 LAB
ADD MANUAL DIFF: NO
ALBUMIN SERPL-MCNC: 4.2 G/DL (ref 3.5–5)
ALP SERPL-CCNC: 104 U/L (ref 38–126)
ALT SERPL-CCNC: 14 U/L (ref 9–52)
ANION GAP SERPL CALC-SCNC: 9 MMOL/L (ref 5–19)
AST SERPL-CCNC: 39 U/L (ref 14–36)
BASOPHILS # BLD AUTO: 0.1 10^3/UL (ref 0–0.2)
BASOPHILS NFR BLD AUTO: 1.5 % (ref 0–2)
BILIRUB DIRECT SERPL-MCNC: 0.2 MG/DL (ref 0–0.4)
BILIRUB SERPL-MCNC: 0.8 MG/DL (ref 0.2–1.3)
BUN SERPL-MCNC: 16 MG/DL (ref 7–20)
CALCIUM: 9.3 MG/DL (ref 8.4–10.2)
CHLORIDE SERPL-SCNC: 102 MMOL/L (ref 98–107)
CO2 SERPL-SCNC: 30 MMOL/L (ref 22–30)
EOSINOPHIL # BLD AUTO: 0.3 10^3/UL (ref 0–0.6)
EOSINOPHIL NFR BLD AUTO: 5.1 % (ref 0–6)
ERYTHROCYTE [DISTWIDTH] IN BLOOD BY AUTOMATED COUNT: 17.8 % (ref 11.5–14)
GLUCOSE SERPL-MCNC: 94 MG/DL (ref 75–110)
HCT VFR BLD CALC: 34.4 % (ref 36–47)
HGB BLD-MCNC: 12 G/DL (ref 12–15.5)
LYMPHOCYTES # BLD AUTO: 1.4 10^3/UL (ref 0.5–4.7)
LYMPHOCYTES NFR BLD AUTO: 27.9 % (ref 13–45)
MCH RBC QN AUTO: 27.4 PG (ref 27–33.4)
MCHC RBC AUTO-ENTMCNC: 35 G/DL (ref 32–36)
MCV RBC AUTO: 78 FL (ref 80–97)
MONOCYTES # BLD AUTO: 0.4 10^3/UL (ref 0.1–1.4)
MONOCYTES NFR BLD AUTO: 7.6 % (ref 3–13)
NEUTROPHILS # BLD AUTO: 2.9 10^3/UL (ref 1.7–8.2)
NEUTS SEG NFR BLD AUTO: 57.9 % (ref 42–78)
PLATELET # BLD: 253 10^3/UL (ref 150–450)
POTASSIUM SERPL-SCNC: 3.6 MMOL/L (ref 3.6–5)
PROT SERPL-MCNC: 7.8 G/DL (ref 6.3–8.2)
RBC # BLD AUTO: 4.39 10^6/UL (ref 3.72–5.28)
SODIUM SERPL-SCNC: 141.2 MMOL/L (ref 137–145)
TOTAL CELLS COUNTED % (AUTO): 100 %
WBC # BLD AUTO: 5 10^3/UL (ref 4–10.5)

## 2019-03-25 PROCEDURE — 36415 COLL VENOUS BLD VENIPUNCTURE: CPT

## 2019-03-25 PROCEDURE — 93005 ELECTROCARDIOGRAM TRACING: CPT

## 2019-03-25 PROCEDURE — 71045 X-RAY EXAM CHEST 1 VIEW: CPT

## 2019-03-25 PROCEDURE — 80053 COMPREHEN METABOLIC PANEL: CPT

## 2019-03-25 PROCEDURE — 99284 EMERGENCY DEPT VISIT MOD MDM: CPT

## 2019-03-25 PROCEDURE — 84484 ASSAY OF TROPONIN QUANT: CPT

## 2019-03-25 PROCEDURE — 85025 COMPLETE CBC W/AUTO DIFF WBC: CPT

## 2019-03-25 PROCEDURE — 93010 ELECTROCARDIOGRAM REPORT: CPT

## 2019-03-25 NOTE — RADIOLOGY REPORT (SQ)
EXAM DESCRIPTION:  CHEST SINGLE VIEW



COMPLETED DATE/TIME:  3/25/2019 11:00 am



REASON FOR STUDY:  chest pain



COMPARISON:  None.



EXAM PARAMETERS:  NUMBER OF VIEWS: One view.

TECHNIQUE: Single frontal radiographic view of the chest acquired.

RADIATION DOSE: NA

LIMITATIONS: None.



FINDINGS:  LUNGS AND PLEURA: No opacities, masses or pneumothorax. No pleural effusion.

MEDIASTINUM AND HILAR STRUCTURES: No masses.  Contour normal.

HEART AND VASCULAR STRUCTURES: Heart normal in size.  Normal vasculature.

BONES: No acute findings.

HARDWARE: None in the chest.

OTHER: No other significant finding.



IMPRESSION:  NO ACUTE RADIOGRAPHIC FINDING IN THE CHEST.



TECHNICAL DOCUMENTATION:  JOB ID:  [id number]

 2011 [employer]- All Rights Reserved



Reading location - IP/workstation name: [email or web address]

## 2019-03-25 NOTE — ER DOCUMENT REPORT
ED Medical Screen (RME)





- General


Chief Complaint: Chest Pain


Stated Complaint: CHEST PAIN


Time Seen by Provider: 03/25/19 10:01


Primary Care Provider: 


SEYMOUR LÓPEZ MD [Primary Care Provider] - Follow up as needed


Notes: 





Patient is a 64-year-old female that presents to the emergency department for 

chief complaint of chest pain.  Patient reports she is been having this pain 

since Friday, describes as a pressure in the left chest going to the left arm 

with hand numbness.





ROS:


Other than noted above, the 12 point review of systems was reviewed with the 

patient and were negative, all pertinent findings are included in the HPI.





PHYSICAL EXAMINATION:





Vital signs reviewed. 





GENERAL: Well-appearing, well-nourished and in no acute distress.





HEAD: Atraumatic, normocephalic.





EYES: Pupils equal round extraocular movements intact,  conjunctiva are normal.





ENT: Nares patent





NECK: Normal range of motion





CV: Heart regular rate and rhythm





LUNGS: No respiratory distress





Musculoskeletal: Normal range of motion





NEUROLOGICAL:  Normal speech





PSYCH: Normal mood, normal affect.





MDM:


Patient seen and examined for rapid initial assessment. Vital signs reviewed.  

EKG reviewed, no STEMI.  A comprehensive ED assessment and evaluation of the 

patient, analysis of test results and completion of the medical decision making 

process will be conducted by additional ED providers.





*Note is created using voice recognition software and may contain spelling, 

syntax or grammatical errors.  











TRAVEL OUTSIDE OF THE [place] IN LAST 30 DAYS: No





- Related Data


Allergies/Adverse Reactions: 


                                        





No Known Allergies Allergy (Verified 03/25/19 09:45)


   











Past Medical History





- Past Medical History


Cardiac Medical History: Reports: Hx Hypertension


Pulmonary Medical History: Reports: Hx COPD


Renal/ Medical History: Denies: Hx Peritoneal Dialysis


Musculoskeltal Medical History: Reports Hx Arthritis


Past Surgical History: Reports: Hx Cholecystectomy - lap inna 5/21/18, Hx 

Hysterectomy





- Immunizations


Hx Diphtheria, Pertussis, Tetanus Vaccination: Yes





Physical Exam





- Vital signs


Vitals: 





                                        











Temp Pulse Resp BP Pulse Ox


 


 98.6 F   93   16   155/82 H  97 


 


 03/25/19 09:54  03/25/19 09:54  03/25/19 09:54  03/25/19 09:54  03/25/19 09:54














Course





- Vital Signs


Vital signs: 





                                        











Temp Pulse Resp BP Pulse Ox


 


 98.6 F   93   16   155/82 H  97 


 


 03/25/19 09:54  03/25/19 09:54  03/25/19 09:54  03/25/19 09:54  03/25/19 09:54














Doctor's Discharge





- Discharge


Referrals: 


SEYMOUR LÓPEZ MD [Primary Care Provider] - Follow up as needed

## 2019-03-25 NOTE — ER DOCUMENT REPORT
ED Cardiac





- General


Chief Complaint: Chest Pain


Stated Complaint: CHEST PAIN


Time Seen by Provider: 03/25/19 10:01


Primary Care Provider: 


SEYMOUR LÓPEZ MD [Primary Care Provider] - Follow up as needed


Mode of Arrival: Ambulatory


Information source: Patient


Notes: 





Patient is a 64-year-old female who presents to the emergency department with 

complaints of chest pain.  Patient reports chest pain has been ongoing since 

Friday.  She states it feels like a pressure on the left side of her chest that 

radiates straight through to her back, up to her jaw and into her left arm.  She

reports recently being diagnosed with costochondritis.  She states this morning 

when she woke up at approximately 4 AM this morning and took 2 baby aspirins and

she states that the chest pain was slightly worse than it had been the last few 

days.  Patient denies any nausea, vomiting, shortness of breath or diaphoresis. 

She states she then went to her primary care provider this morning who referred 

her to the emergency department.  Patient has past medical history of 

pancreatitis, hypertension COPD and osteoarthritis.  Patient reports she had a 

stress test done 3 years ago in [place] which was normal.  Patient denies ever 

having smoked.  Past surgical history includes cholecystectomy and partial 

hysterectomy.





TRAVEL OUTSIDE OF THE [place] IN LAST 30 DAYS: No





- Related Data


Allergies/Adverse Reactions: 


                                        





No Known Allergies Allergy (Verified 03/25/19 09:45)


   











Past Medical History





- General


Information source: Patient





- Social History


Smoking Status: Never Smoker


Chew tobacco use (# tins/day): No


Frequency of alcohol use: Occasional


Drug Abuse: None


Family History: Reviewed & Not Pertinent


Patient has suicidal ideation: No


Patient has homicidal ideation: No





- Past Medical History


Cardiac Medical History: Reports: Hx Hypertension


Pulmonary Medical History: Reports: Hx COPD


Renal/ Medical History: Denies: Hx Peritoneal Dialysis


GI Medical History: Reports: Hx Pancreatitis


Musculoskeletal Medical History: Reports Hx Arthritis


Past Surgical History: Reports: Hx Cholecystectomy - lap inna 5/21/18, Hx 

Hysterectomy





- Immunizations


Hx Diphtheria, Pertussis, Tetanus Vaccination: Yes





Review of Systems





- Review of Systems


Constitutional: No symptoms reported


EENT: No symptoms reported


Cardiovascular: Chest pain.  denies: Palpitations, Dyspnea


Respiratory: No symptoms reported.  denies: Short of breath


Gastrointestinal: No symptoms reported.  denies: Abdominal pain, Nausea, 

Vomiting


Genitourinary: No symptoms reported


Female Genitourinary: No symptoms reported


Musculoskeletal: No symptoms reported


Skin: No symptoms reported


Hematologic/Lymphatic: No symptoms reported


Neurological/Psychological: No symptoms reported





Physical Exam





- Vital signs


Vitals: 


                                        











Temp Pulse Resp BP Pulse Ox


 


 98.6 F   93   16   155/82 H  97 


 


 03/25/19 09:54  03/25/19 09:54  03/25/19 09:54  03/25/19 09:54  03/25/19 09:54














- Notes


Notes: 





PHYSICAL EXAMINATION:





GENERAL: Well-appearing, well-nourished and in no acute distress.





HEAD: Atraumatic, normocephalic.





EYES: Pupils equal round and reactive to light, extraocular movements intact, 

conjunctiva are normal.





ENT: Nares patent, oropharynx clear without exudates.  Moist mucous membranes.





NECK: Normal range of motion, supple without lymphadenopathy





LUNGS: Breath sounds clear to auscultation bilaterally and equal.  No wheezes 

rales or rhonchi.





HEART: Regular rate and rhythm without murmurs





ABDOMEN: Soft, nontender, nondistended abdomen.  No guarding, no rebound.  No 

masses appreciated.





Female : deferred





Musculoskeletal: Normal range of motion, no pitting or edema.  No cyanosis.  

Reproducible chest pain to the left chest wall with palpation.





NEUROLOGICAL: Cranial nerves grossly intact.  Normal speech, normal gait.  

Normal sensory, motor exams





PSYCH: Normal mood, normal affect.





SKIN: Warm, Dry, normal turgor, no rashes or lesions noted.





Course





- Re-evaluation


Re-evalutation: 





Labs as recorded are unremarkable.  Initial troponin is negative.  Chest x-ray 

with no evidence of cardiomegaly, pneumonia or any other acute findings.  EKG 

reveals a sinus rhythm, rate of 96, QTc 455, normal axis, no ST segment 

elevations or depressions.  Will obtain second troponin as ordered.  Patient has

been chest pain-free here in the emergency department other than when I palpated

her left chest wall.  Patient's vital signs have been within normal limits.





Repeat troponin is also negative.  Patient reports she had a stress test 

approximately 3 years ago which was unremarkable.  Patient does have follow-up 

with her PCP.  Patient wishes to be discharged home and I feel this is a safe 

option.  Patient has a heart score of 2.  Patient given ED return precautions 

which patient verbalizes understanding of and agreement to.








- Vital Signs


Vital signs: 


                                        











Temp Pulse Resp BP Pulse Ox


 


 98.6 F   93   15   143/85 H  96 


 


 03/25/19 09:54  03/25/19 09:54  03/25/19 16:01  03/25/19 16:01  03/25/19 16:01














- Laboratory


Result Diagrams: 


                                 03/25/19 10:50





                                 03/25/19 10:50


Laboratory results interpreted by me: 


                                        











  03/25/19 03/25/19





  10:50 10:50


 


Hct  34.4 L 


 


MCV  78 L 


 


RDW  17.8 H 


 


Est GFR (Non-Af Amer)   50 L


 


AST   39 H














Discharge





- Discharge


Clinical Impression: 


Chest pain


Qualifiers:


 Chest pain type: unspecified Qualified Code(s): R07.9 - Chest pain, unspecified





Condition: Stable


Disposition: HOME, SELF-CARE


Additional Instructions: 


Chest Pain of Unclear Cause





   The exact cause of your chest pain isn't clear. Fortunately, there is no 

evidence of a dangerous medical condition.  Further testing may be required to 

find the source of the pain.


   Most often, we find that this pain is coming from the chest wall -- the 

muscles or rib joints in the chest.  But chest pain can come from the lung and 

lung lining, the esophagus, the heart valves or heart lining, and even the s

tomach or gallbladder.


   Rest.  Eat lightly until the pain is gone.  We may prescribe medicine for pa

in and inflammation.


   You should call the physician immediately if the pain radiates to the 

shoulder, jaw or arms; if you start to run a fever or develop a cough; or if you

develop shortness of breath, or other new or alarming symptoms.





****You were seen today for chest pain.  The exact cause of your pain is 

unclear. However, based on your cardiac enzyme testing, chest x-ray, and EKG it 

does not appear that it is from an immediately life-threatening cause at this 

time.  Although your testing here is normal is critical that you follow-up with 

your primary care physician for continued evaluation of this chest pain and 

possible stress testing.  I recommended you see your physician within the next 

24-48 hours to be evaluated for consideration of a stress test.  Please return 

to emergency department immediately if you have worsening of your chest pain, 

shortness of breath, vomiting, become unable to exert yourself due to pain or 

difficulty breathing, you pass out, or have any pain that radiates into your 

arms, jaw, or back. Please also return if you have any additional symptoms that 

are concerning to you.****


Referrals: 


SEYMOUR LÓPEZ MD [Primary Care Provider] - Follow up as needed

## 2019-04-26 ENCOUNTER — HOSPITAL ENCOUNTER (OUTPATIENT)
Dept: HOSPITAL 62 - OD | Age: 64
End: 2019-04-26
Attending: FAMILY MEDICINE
Payer: COMMERCIAL

## 2019-04-26 DIAGNOSIS — R79.89: Primary | ICD-10-CM

## 2019-04-26 LAB
ANION GAP SERPL CALC-SCNC: 17 MMOL/L (ref 5–19)
BUN SERPL-MCNC: 19 MG/DL (ref 7–20)
CALCIUM: 10 MG/DL (ref 8.4–10.2)
CHLORIDE SERPL-SCNC: 105 MMOL/L (ref 98–107)
CO2 SERPL-SCNC: 23 MMOL/L (ref 22–30)
CREAT UR-MCNC: 134 MG/DL (ref 15–278)
GLUCOSE SERPL-MCNC: 86 MG/DL (ref 75–110)
POTASSIUM SERPL-SCNC: 4.7 MMOL/L (ref 3.6–5)
PROT UR STRIP-MCNC: 5.9 MG/DL (ref ?–12)
SODIUM SERPL-SCNC: 145.1 MMOL/L (ref 137–145)
UR PRO/CREAT RATIO RESULT: 0 MG/MG (ref 0–0.2)

## 2019-04-26 PROCEDURE — 82570 ASSAY OF URINE CREATININE: CPT

## 2019-04-26 PROCEDURE — 80048 BASIC METABOLIC PNL TOTAL CA: CPT

## 2019-04-26 PROCEDURE — 84156 ASSAY OF PROTEIN URINE: CPT

## 2019-04-26 PROCEDURE — 36415 COLL VENOUS BLD VENIPUNCTURE: CPT

## 2019-06-06 ENCOUNTER — HOSPITAL ENCOUNTER (OUTPATIENT)
Dept: HOSPITAL 62 - SC | Age: 64
Discharge: HOME | End: 2019-06-06
Attending: OPHTHALMOLOGY
Payer: COMMERCIAL

## 2019-06-06 DIAGNOSIS — K21.9: ICD-10-CM

## 2019-06-06 DIAGNOSIS — Z79.899: ICD-10-CM

## 2019-06-06 DIAGNOSIS — M79.7: ICD-10-CM

## 2019-06-06 DIAGNOSIS — Z79.51: ICD-10-CM

## 2019-06-06 DIAGNOSIS — J44.9: ICD-10-CM

## 2019-06-06 DIAGNOSIS — I10: ICD-10-CM

## 2019-06-06 DIAGNOSIS — H25.13: Primary | ICD-10-CM

## 2019-06-06 PROCEDURE — 66984 XCAPSL CTRC RMVL W/O ECP: CPT

## 2019-06-06 PROCEDURE — V2632 POST CHMBR INTRAOCULAR LENS: HCPCS

## 2019-06-06 RX ADMIN — DORZOLAMIDE HYDROCHLORIDE AND TIMOLOL MALEATE PRN DROP: 20; 5 SOLUTION OPHTHALMIC at 09:23

## 2019-06-06 RX ADMIN — CYCLOPENTOLATE HYDROCHLORIDE AND PHENYLEPHRINE HYDROCHLORIDE PRN DROP: 2; 10 SOLUTION/ DROPS OPHTHALMIC at 08:58

## 2019-06-06 RX ADMIN — TETRACAINE HYDROCHLORIDE PRN DROP: 5 SOLUTION OPHTHALMIC at 08:39

## 2019-06-06 RX ADMIN — BESIFLOXACIN PRN DROP: 6 SUSPENSION OPHTHALMIC at 00:00

## 2019-06-06 RX ADMIN — BESIFLOXACIN PRN DROP: 6 SUSPENSION OPHTHALMIC at 08:40

## 2019-06-06 RX ADMIN — TROPICAMIDE PRN DROP: 10 SOLUTION/ DROPS OPHTHALMIC at 08:58

## 2019-06-06 RX ADMIN — TROPICAMIDE PRN DROP: 10 SOLUTION/ DROPS OPHTHALMIC at 08:39

## 2019-06-06 RX ADMIN — BESIFLOXACIN PRN DROP: 6 SUSPENSION OPHTHALMIC at 09:23

## 2019-06-06 RX ADMIN — CYCLOPENTOLATE HYDROCHLORIDE AND PHENYLEPHRINE HYDROCHLORIDE PRN DROP: 2; 10 SOLUTION/ DROPS OPHTHALMIC at 08:52

## 2019-06-06 RX ADMIN — DORZOLAMIDE HYDROCHLORIDE AND TIMOLOL MALEATE PRN DROP: 20; 5 SOLUTION OPHTHALMIC at 00:00

## 2019-06-06 RX ADMIN — CYCLOPENTOLATE HYDROCHLORIDE AND PHENYLEPHRINE HYDROCHLORIDE PRN DROP: 2; 10 SOLUTION/ DROPS OPHTHALMIC at 08:40

## 2019-06-06 RX ADMIN — TROPICAMIDE PRN DROP: 10 SOLUTION/ DROPS OPHTHALMIC at 08:52

## 2019-06-06 RX ADMIN — TETRACAINE HYDROCHLORIDE PRN DROP: 5 SOLUTION OPHTHALMIC at 08:59

## 2019-06-06 RX ADMIN — TETRACAINE HYDROCHLORIDE PRN DROP: 5 SOLUTION OPHTHALMIC at 09:00

## 2019-06-06 RX ADMIN — BESIFLOXACIN PRN DROP: 6 SUSPENSION OPHTHALMIC at 08:59

## 2019-06-06 NOTE — [facility] DISCHARGE SUMMARY E
[facility] Discharge Summary



NAME: CARLOS LO

                                      MRN: [id number]

                                AGE: 64Y

ADMITTED: 06/06/2019           DISCHARGED:



The patient is a 64-year-old patient who underwent cataract extraction

right eye.



DIAGNOSIS:

Cataract right eye.  She underwent surgery because she was having decrease

in night vision and difficulty seeing to read books.



She should be on a regular diet.  No bending at the waist and no heavy

lifting.  She should use her moxifloxacin, Prolensa, and Pred Forte at 3:00

p.m. and 8:00 p.m. and sleep with a rigid shield.  I will see her for her

1-day postop tomorrow.





DICTATING PHYSICIAN: CORBY VARNER M.D.



[id number]              DT: 06/06/2019 2059

PHY#: [id number]         DD: 06/06/2019 1808

ID:   [id number]               JOB#: [id number]       ACCT: [id number]



cc:CORBY VARNER M.D.

>

## 2019-06-06 NOTE — [facility] OPERATIVE REPORT E
[facility] Operative Report



NAME: CARLOS LO

                                      MRN: [id number]

                             AGE: 64Y

DATE OF SURGERY: 06/06/2019                   ROOM:



 

PREOPERATIVE DIAGNOSIS:

CATARACT, RIGHT EYE.



POSTOPERATIVE DIAGNOSIS:

CATARACT, RIGHT EYE.



OPERATION:

Cataract extraction with insertion of an IOL of the right eye.



SURGEON:

CORBY VARNER M.D.



ANESTHESIA:

Topical.



PROCEDURE:

After obtaining appropriate consent, the patient's right eye was prepped

and draped in sterile fashion as well as the surgeon in a sterile manner

and cataract surgery was started.  First a paracentesis blade was used to

make a side-port incision.  Viscoelastic was used to inflate the anterior

chamber.  Next a 2.4 mm incision was made with a 2.4 mm blade, clear

corneal temporally.  A continuous capsulorrhexis was made using a cystotome

and Utrata forceps.  Following this hydrodissection was carried out to make

the lens fully loose and mobile and it was rotated 90 degrees.  Following

this, a divide-and-conquer technique was used to phacoemulsify the lens

with a CDE of 3.74. The remaining cortex was removed with

irrigation/aspiration.  Provisc was instilled into the capsular bag to

inflate the bag.  A SN60WF, 18.5 diopter lens was placed.  The remaining

viscoelastic material was removed with irrigation/aspiration.  Following

this, the incision was found to be watertight.  Besivance was instilled

into the eye and a protective shield was placed over the eye.   The patient

returned to the postoperative recovery in stable condition.





DICTATING PHYSICIAN: CORBY VARNER M.D.



[id number]              DT: 06/06/2019 2058

PHY#: [id number]         DD: 06/06/2019 1808

ID:   [id number]               JOB#: [id number]       ACCT: [id number]



cc:CORBY VARNER M.D.

>

## 2020-01-13 ENCOUNTER — HOSPITAL ENCOUNTER (EMERGENCY)
Dept: HOSPITAL 62 - ER | Age: 65
LOS: 1 days | Discharge: HOME | End: 2020-01-14
Payer: COMMERCIAL

## 2020-01-13 DIAGNOSIS — I10: Primary | ICD-10-CM

## 2020-01-13 DIAGNOSIS — R51: ICD-10-CM

## 2020-01-13 DIAGNOSIS — J44.9: ICD-10-CM

## 2020-01-13 LAB
ADD MANUAL DIFF: NO
ALBUMIN SERPL-MCNC: 4.6 G/DL (ref 3.5–5)
ALP SERPL-CCNC: 106 U/L (ref 38–126)
ANION GAP SERPL CALC-SCNC: 11 MMOL/L (ref 5–19)
AST SERPL-CCNC: 21 U/L (ref 14–36)
BASOPHILS # BLD AUTO: 0.1 10^3/UL (ref 0–0.2)
BASOPHILS NFR BLD AUTO: 1.3 % (ref 0–2)
BILIRUB DIRECT SERPL-MCNC: 0.3 MG/DL (ref 0–0.4)
BILIRUB SERPL-MCNC: 0.6 MG/DL (ref 0.2–1.3)
BUN SERPL-MCNC: 14 MG/DL (ref 7–20)
CALCIUM: 9.7 MG/DL (ref 8.4–10.2)
CHLORIDE SERPL-SCNC: 102 MMOL/L (ref 98–107)
CO2 SERPL-SCNC: 28 MMOL/L (ref 22–30)
EOSINOPHIL # BLD AUTO: 0.2 10^3/UL (ref 0–0.6)
EOSINOPHIL NFR BLD AUTO: 3.4 % (ref 0–6)
ERYTHROCYTE [DISTWIDTH] IN BLOOD BY AUTOMATED COUNT: 17.5 % (ref 11.5–14)
GLUCOSE SERPL-MCNC: 87 MG/DL (ref 75–110)
HCT VFR BLD CALC: 36.5 % (ref 36–47)
HGB BLD-MCNC: 12.7 G/DL (ref 12–15.5)
LYMPHOCYTES # BLD AUTO: 1.6 10^3/UL (ref 0.5–4.7)
LYMPHOCYTES NFR BLD AUTO: 30.2 % (ref 13–45)
MCH RBC QN AUTO: 27.7 PG (ref 27–33.4)
MCHC RBC AUTO-ENTMCNC: 34.7 G/DL (ref 32–36)
MCV RBC AUTO: 80 FL (ref 80–97)
MONOCYTES # BLD AUTO: 0.4 10^3/UL (ref 0.1–1.4)
MONOCYTES NFR BLD AUTO: 7.5 % (ref 3–13)
NEUTROPHILS # BLD AUTO: 3 10^3/UL (ref 1.7–8.2)
NEUTS SEG NFR BLD AUTO: 57.6 % (ref 42–78)
PLATELET # BLD: 264 10^3/UL (ref 150–450)
POTASSIUM SERPL-SCNC: 4.5 MMOL/L (ref 3.6–5)
PROT SERPL-MCNC: 8.1 G/DL (ref 6.3–8.2)
RBC # BLD AUTO: 4.57 10^6/UL (ref 3.72–5.28)
TOTAL CELLS COUNTED % (AUTO): 100 %
WBC # BLD AUTO: 5.2 10^3/UL (ref 4–10.5)

## 2020-01-13 PROCEDURE — 70450 CT HEAD/BRAIN W/O DYE: CPT

## 2020-01-13 PROCEDURE — 84484 ASSAY OF TROPONIN QUANT: CPT

## 2020-01-13 PROCEDURE — 71046 X-RAY EXAM CHEST 2 VIEWS: CPT

## 2020-01-13 PROCEDURE — 85025 COMPLETE CBC W/AUTO DIFF WBC: CPT

## 2020-01-13 PROCEDURE — 80053 COMPREHEN METABOLIC PANEL: CPT

## 2020-01-13 PROCEDURE — 93010 ELECTROCARDIOGRAM REPORT: CPT

## 2020-01-13 PROCEDURE — 99284 EMERGENCY DEPT VISIT MOD MDM: CPT

## 2020-01-13 PROCEDURE — 36415 COLL VENOUS BLD VENIPUNCTURE: CPT

## 2020-01-13 PROCEDURE — 93005 ELECTROCARDIOGRAM TRACING: CPT

## 2020-01-13 NOTE — ER DOCUMENT REPORT
ED Blood Pressure Problem





- General


Chief Complaint: High Blood Pressure


Stated Complaint: BLOOD PRESSURE ISSUE


Time Seen by Provider: 01/13/20 17:23


Primary Care Provider: 


SEYMOUR LÓPEZ MD [Primary Care Provider] - Follow up as needed


Mode of Arrival: Ambulatory


Information source: Patient


TRAVEL OUTSIDE OF THE [place] IN LAST 30 DAYS: No





- HPI


Patient complains to provider of: High blood pressure


Onset: Just prior to arrival


Onset/Duration: Gradual


Quality of pain: Dull, Other - Achy headache in the frontal left side of face.


Severity: Moderate


Problem is: New problem


Pt currently taking medication for problem: Yes - Patient reports running out of

medicine yesterday for blood pressure


Associated symptoms: Headache


Similar symptoms previously: No


Recently seen / treated by doctor: Yes


Notes: 





Patient reports she ran out of her blood pressure medicines yesterday.  She got 

up to go to work and did go to work this morning.  Then she noted that she 

needed to see her doctor regarding her blood pressure medicines.  So she was 

found to have systolic blood pressure over 200 around 202 diastolic around 195. 

With blood pressure elevation she was directed to come to the emergency 

department for further evaluation.





- Related Data


Allergies/Adverse Reactions: 


                                        





No Known Allergies Allergy (Verified 01/13/20 17:20)


   








Home Medications: metoprolol.  HCTZ





Past Medical History





- Social History


Smoking Status: Never Smoker


Frequency of alcohol use: None


Drug Abuse: None


Lives with: Alone


Family History: Reviewed & Not Pertinent


Patient has suicidal ideation: No


Patient has homicidal ideation: No





- Past Medical History


Cardiac Medical History: Reports: Hx Hypertension, Other - 64-year-old black 

female patient chest x-ray shows history of COPD osteopor


   Denies: Hx Heart Attack


Pulmonary Medical History: Reports: Hx COPD


   Denies: Hx Asthma


EENT Medical History: Reports: None


Neurological Medical History: Denies: Hx Cerebrovascular Accident, Hx Seizures


Endocrine Medical History: Reports: None


Renal/ Medical History: Reports: None.  Denies: Hx Peritoneal Dialysis


Malignancy Medical History: Reports: None


GI Medical History: Reports: Hx Hiatal Hernia, Hx Pancreatitis.  Denies: Hx 

Hepatitis, Hx Ulcer


Musculoskeletal Medical History: Reports Hx Arthritis


Skin Medical History: Reports None


Psychiatric Medical History: Reports: None


Traumatic Medical History: Reports: None


Infectious Medical History: Reports: None.  Denies: Hx Hepatitis


Past Surgical History: Reports: Hx Cholecystectomy - lap inna 5/21/18, Hx 

Hysterectomy.  Denies: Hx Mastectomy, Hx Open Heart Surgery, Hx Pacemaker





- Immunizations


Hx Diphtheria, Pertussis, Tetanus Vaccination: Yes





Review of Systems





- Review of Systems


Constitutional: Other - Headache associated with hypertension.


EENT: No symptoms reported


Cardiovascular: No symptoms reported, Other - Hypertension


Respiratory: Other - COPD


Gastrointestinal: Other - No GI problems


Genitourinary: Other - No urinary problems


Female Genitourinary: Other - No vaginal problems


Musculoskeletal: Other - Fibromyalgia


Skin: Other - No skin disorder


Hematologic/Lymphatic: Other - No abnormal hematologic disorder


Neurological/Psychological: Other - No prior stroke





Physical Exam





- Vital signs


Vitals: 


                                        











Temp Pulse Resp BP Pulse Ox


 


 98.8 F   99   20   176/93 H  100 


 


 01/13/20 16:42  01/13/20 16:42  01/13/20 16:42  01/13/20 16:42  01/13/20 16:42











Interpretation: Normal





- General


General appearance: Appears well, Alert





- HEENT


Head: Normocephalic, Atraumatic


Eyes: Normal


Pupils: PERRL





- Respiratory


Respiratory status: No respiratory distress


Chest status: Nontender


Breath sounds: Normal


Chest palpation: Normal





- Cardiovascular


Rhythm: Regular


Heart sounds: Normal auscultation


Murmur: No





- Abdominal


Inspection: Normal


Distension: No distension


Bowel sounds: Normal


Tenderness: Nontender


Organomegaly: No organomegaly





- Back


Back: Normal, Nontender





- Extremities


General upper extremity: Normal inspection, Nontender, Normal color, Normal ROM,

Normal temperature


General lower extremity: Normal inspection, Nontender, Normal color, Normal ROM,

Normal temperature, Normal weight bearing.  No: Christy's sign





- Neurological


Neuro grossly intact: Yes


Cognition: Normal


Orientation: AAOx4


Pen[place]ton Coma Scale Eye Opening: Spontaneous


Pen[place] Coma Scale Verbal: Oriented


[place] Coma Scale Motor: Obeys Commands


Pen[place] Coma Scale Total: 15


Speech: Normal


Motor strength normal: LUE, RUE, LLE, RLE


Sensory: Normal





- Psychological


Associated symptoms: Normal affect, Normal mood





- Skin


Skin Temperature: Warm


Skin Moisture: Dry


Skin Color: Normal





Course





- Re-evaluation


Re-evalutation: 





01/14/20 00:20


Blood pressure is improving slowly.  Patient is resting comfortably.  CT scan of

head did not show any acute process.  Patient has been given her usual 

medications metoprolol and hydrochlorothiazide which she usually takes daily 

today yesterday she did not have any of her blood pressure medicines and hence 

ended up in the ED with accelerated hypertension.





- Vital Signs


Vital signs: 


                                        











Temp Pulse Resp BP Pulse Ox


 


 98.8 F   99   12   168/99 H  96 


 


 01/13/20 16:42  01/13/20 16:42  01/14/20 00:10  01/14/20 00:10  01/14/20 00:10














- Laboratory


Result Diagrams: 


                                 01/13/20 17:55





                                 01/13/20 17:55


Laboratory results interpreted by me: 


                                        











  01/13/20 01/13/20





  17:55 17:55


 


RDW  17.5 H 


 


Est GFR ( Amer)   59 L


 


Est GFR (MDRD) Non-Af   49 L














- Diagnostic Test


Radiology reviewed: Image reviewed, Reports reviewed


Radiology results interpreted by me: 





01/13/20 21:50


Chest x-ray does not show any acute cardiopulmonary process.





- EKG Interpretation by Me


EKG shows normal: Sinus rhythm


Rate: Normal


Rhythm: NSR


Axis/QRS: Left axis deviation


When compared to previous EKG there are: No significant change





Discharge





- Discharge


Clinical Impression: 


 Hypertension, Headache





Condition: Stable


Disposition: HOME, SELF-CARE


Instructions:  High Blood Pressure, Requiring Treatment ([facility]), 

Hydrochlorothiazide ([facility]), Beta Blockers ([facility])


Additional Instructions: 


Your blood pressure was elevated today associated with a headache.  Most likely 

this is all due to the fact that you were out of your usual blood pressure m

edicines today..  Inasmuch as your primary provider has sent to the pharmacy 

your renewed prescriptions of metoprolol and hydrochlorothiazide you may pick 

those medicines up tomorrow.  And I recommend if there is further headache 

issues I would recommend Tylenol as needed.


Referrals: 


SEYMOUR LÓPEZ MD [Primary Care Provider] - Follow up as needed





ED NIH Stroke Scale





- NIH Stroke Scale


*: 1. NIH scale should be completed with appropriate accompanying assessment 

tools.


*: 2. The NIH should reflect what the patient is capable of doing and should not

be coached by the clinician.


1a. Level of Consciousness: 0=Alert;keenly responsive


-: 1=Drowsy


-: 2=Obtunded


-: 3=Coma/unresponsive or reflex to noxious stimuli.


1a. Responses: 0


1b. Orientation Questions: a. What month is it?


-: b. How old are you?


-: 0=Answers both questions correctly.


-: 1=Answers one question correctly or patient is intubated or has orotracheal 

trauma.


-: 2=Answers neither question correctly.


1b. Responses: 0


1c. Response to commands: a. Open and close eyes?


-: b.  and release hand?


-: Credit is given despite weakness. Demonstration of task is permitted. 

Substitute command if hands cannot be used.


-: 0=Performs both tasks correctly


-: 1=Performs one task correctly


-: 2=Performs neither task correctly


1c. Responses: 0


2. Gaze: Establish eye contact and instruct patient to "Follow my finger"


-: 0=Normal


-: 1=Partial gaze palsy. Gaze is abnormal in one or both eyes, but where forced 

deviation or total gaze paresis is not present.


-: 2=Forced deviation or total gaze paresis.


2. Responses: 0


3. Visual Fields: Sees fingers in all four quadrants.


-: 0=No visual loss.


-: 1=Partial hemianopsia.


-: 2=Complete hemianopsia.


-: 3=Bilateral hemianopsia (including Cortical blindness)


3. Responses: 0


4. Facial Movement: Instruct patient to:


-: a. Show me your teeth


-: b. Raise your eyebrows


-: c. Close your eyes


-: d. Smile


-: 0=Normal symmetrical movement


-: 1=Minor paralysis (flattened nasolabial fold, asymmetry on smiling).


-: 2=Partial paralysis (total or near total paralysis of lower face).


-: 3=Complete paralysis of upper and lower face


4. Responses: 0


5. Motor functions (left arm): Alternate sides and extend each arm with palms 

down (90 degrees if sitting or 45 degrees for supine).


-: 0=No drift;limb holds for full 10 seconds.


-: 1=Drift; limb holds but drifts down before full 10 seconds, but does not hit 

bed.


-: 2=Some effort against gravity; limb cannot get to or maintain position.


-: 3=No effort against gravity; limb falls.


-: 4=No movement.


-: UN=Amputation, joint fusion, explain in comments.


5. Responses (left arm): 0


5. Motor Functions (right arm): Alternate sides and extend each arm with palms 

down (90 degrees if sitting or 45 degrees for supine).


-: 0=No drift;limb holds for full 10 seconds.


-: 1=Drift; limb holds but drifts down before full 10 seconds, but does not hit 

bed.


-: 2=Some effort against gravity; limb cannot get to or maintain position.


-: 3=No effort against gravity; limb falls.


-: 4=No movement.


-: UN=Amputation, joint fusion, explain in comments.


5. Responses (right arm): 0


6. Motor Functions (left leg): With patient lying supine, alternate sides and 

extend each leg (30 degrees always while supine).


-: 0=No drift, leg holds position for full 5 seconds


-: 1=Drift; leg falls before full 5 seconds but does not hit bed.


-: 2=Some effort against gravity, leg falls to bed but some effort against 

gravity.


-: 3=No effort against gravity, leg falls to bed immediately.


-: 4=No movement.


-: UN=Amputation, joint fusion; explain in comments.


6. Responses (left leg): 0


6. Motor Functions (right leg): With patient lying supine, alternate sides and 

extend each leg (30 degrees always while supine).


-: 0=No drift, leg holds position for full 5 seconds


-: 1=Drift; leg falls before full 5 seconds but does not hit bed.


-: 2=Some effort against gravity, leg falls to bed but some effort against 

gravity.


-: 3=No effort against gravity, leg falls to bed immediately.


-: 4=No movement.


-: UN=Amputation, joint fusion; explain in comments.


6. Responses (right leg): 0


7. Limb Ataxia: With eyes open instruct patient to:


-: a. "Touch your finger to your nose".


-: b. "Touch your heel to your shin"


-: 0=Absent


-: 1=Present in one limb.


-: 2=Present in two limbs.


-: UN=Amputation or joint fusion; explain in comments.


7. Responses: 0


8. Sensory: Test sensation using pinprick or noxious stimuli.  Test as many body

parts as possible.


-: 0=Normal;no sensory loss


-: 1=Mile to moderate sensory loss (patient feels pin prick but is less sharp on

affected side).


-: 2=Severe or total sensory loss.


8. Responses: 0


9. Best Language: Instruct patient to:


-: a. "Describe what you see in this picture."


-: b. "Name the items in this picture."


-: c. "Read these sentences."


-: 0=No aphasia, normal


-: 1=Mild to moderate aphasia.


-: 2=Severe aphasia


-: 3=Mute, global aphasia, no usable speech or auditory comprehension.


10. Articulation, Dysarthia: Instruct patient to:


-: "Read these words" or "Repeat these words"


-: 0=Normal


-: 1=Mild to moderate; patient may slur some words but can be understood without

difficulty.


-: 2=Severe; patients speech so slurred as to be unintelligible in the absence 

of dysphasia.


-: UN=Intubated or other physical barrier, explain in comments.


10. Responses: 0


11. Extinction or inattention: 0=No abnormality


-: 1= Visual, tactile, auditory, spatial, or personal inattention or extinction 

to bilateral simulation in one or the sensory modalities.


-: 2=Profound blake-inattention or blake-inattention to more than one modality; 

does not recognize own hand.


Total Score: 0

## 2020-01-13 NOTE — ER DOCUMENT REPORT
ED Blood Pressure Problem





- General


Chief Complaint: High Blood Pressure


Stated Complaint: BLOOD PRESSURE ISSUE


Time Seen by Provider: 01/13/20 17:23


Primary Care Provider: 


SEYMOUR LÓPEZ MD [Primary Care Provider] - Follow up as needed


TRAVEL OUTSIDE OF THE [place] IN LAST 30 DAYS: No





- Related Data


Allergies/Adverse Reactions: 


                                        





No Known Allergies Allergy (Verified 01/13/20 17:20)


   








Home Medications: metoprolol.  HCTZ





Past Medical History





- Social History


Smoking Status: Never Smoker


Family History: Reviewed & Not Pertinent


Patient has suicidal ideation: No


Patient has homicidal ideation: No





- Past Medical History


Cardiac Medical History: Reports: Hx Hypertension


   Denies: Hx Heart Attack


Pulmonary Medical History: Reports: Hx COPD


   Denies: Hx Asthma


Neurological Medical History: Denies: Hx Cerebrovascular Accident, Hx Seizures


Renal/ Medical History: Denies: Hx Peritoneal Dialysis


GI Medical History: Reports: Hx Hiatal Hernia, Hx Pancreatitis.  Denies: Hx 

Hepatitis, Hx Ulcer


Musculoskeletal Medical History: Reports Hx Arthritis


Infectious Medical History: Denies: Hx Hepatitis


Past Surgical History: Reports: Hx Cholecystectomy - lap inna 5/21/18, Hx 

Hysterectomy.  Denies: Hx Mastectomy, Hx Open Heart Surgery, Hx Pacemaker





- Immunizations


Hx Diphtheria, Pertussis, Tetanus Vaccination: Yes





Physical Exam





- Vital signs


Vitals: 





                                        











Temp Pulse Resp BP Pulse Ox


 


 98.8 F   99   20   176/93 H  100 


 


 01/13/20 16:42  01/13/20 16:42  01/13/20 16:42  01/13/20 16:42  01/13/20 16:42














Course





- Vital Signs


Vital signs: 





                                        











Temp Pulse Resp BP Pulse Ox


 


 98.8 F   99   20   176/93 H  99 


 


 01/13/20 16:42  01/13/20 16:42  01/13/20 16:42  01/13/20 16:42  01/13/20 18:56














- Laboratory


Result Diagrams: 


                                 01/13/20 17:55





                                 01/13/20 17:55


Laboratory results interpreted by me: 





                                        











  01/13/20 01/13/20





  17:55 17:55


 


RDW  17.5 H 


 


Est GFR ( Amer)   59 L


 


Est GFR (MDRD) Non-Af   49 L














Discharge





- Discharge


Referrals: 


SEYMOUR LÓPEZ MD [Primary Care Provider] - Follow up as needed

## 2020-01-13 NOTE — ER DOCUMENT REPORT
ED Medical Screen (RME)





- General


Chief Complaint: Blood Pressure Problem


Stated Complaint: BLOOD PRESSURE ISSUE


Time Seen by Provider: 01/13/20 17:23


Primary Care Provider: 


SEYMOUR LÓPEZ MD [Primary Care Provider] - Follow up as needed


TRAVEL OUTSIDE OF THE [place] IN LAST 30 DAYS: No





- HPI


Notes: 





01/13/20 17:27


Patient is a 64-year-old female with history of hypertension and COPD who 

presents complaining of dull left arm pain for the past couple days and right-

sided mild headache and right-sided jaw pain that began around 12 PM today.  

This is not the worst headache of her life and did not start as a thunderclap.  

Denies drug allergies.  She is not having any active chest pain or shortness of 

breath at this time.  No abdominal pain or vomiting.





Pt declining any PO meds for her HA at this time.





I have treated and performed a rapid initial assessment of this patient.  A 

comprehensive ED assessment and evaluation of the patient, analysis of test 

results and completion of medical decision making process will be conducted by 

additional ED providers.





PHYSICAL EXAMINATION:





GENERAL: Well-appearing, well-nourished and in no acute distress.  A&Ox4.  

Answers questions appropriately.


Heart: RRR


Lungs: CTAB


Neuro: Cranial nerves grossly intact.  GCS 15.





- Related Data


Allergies/Adverse Reactions: 


                                        





No Known Allergies Allergy (Verified 01/13/20 17:20)


   











Past Medical History





- Past Medical History


Cardiac Medical History: Reports: Hx Hypertension


   Denies: Hx Heart Attack


Pulmonary Medical History: Reports: Hx COPD


   Denies: Hx Asthma


Neurological Medical History: Denies: Hx Cerebrovascular Accident, Hx Seizures


Renal/ Medical History: Denies: Hx Peritoneal Dialysis


GI Medical History: Reports: Hx Hiatal Hernia, Hx Pancreatitis.  Denies: Hx 

Hepatitis, Hx Ulcer


Musculoskeltal Medical History: Reports Hx Arthritis


Infectious Medical History: Denies: Hx Hepatitis


Past Surgical History: Reports: Hx Cholecystectomy - lap inna 5/21/18, Hx 

Hysterectomy.  Denies: Hx Mastectomy, Hx Open Heart Surgery, Hx Pacemaker





- Immunizations


Hx Diphtheria, Pertussis, Tetanus Vaccination: Yes





Physical Exam





- Vital signs


Vitals: 





                                        











Temp Pulse Resp BP Pulse Ox


 


 98.8 F   99   20   176/93 H  100 


 


 01/13/20 16:42  01/13/20 16:42  01/13/20 16:42  01/13/20 16:42  01/13/20 16:42














Course





- Vital Signs


Vital signs: 





                                        











Temp Pulse Resp BP Pulse Ox


 


 98.8 F   99   20   176/93 H  100 


 


 01/13/20 16:42  01/13/20 16:42  01/13/20 16:42  01/13/20 16:42  01/13/20 16:42














Doctor's Discharge





- Discharge


Referrals: 


SEYMOUR LÓPEZ MD [Primary Care Provider] - Follow up as needed

## 2020-01-13 NOTE — RADIOLOGY REPORT (SQ)
EXAM DESCRIPTION:

CT HEAD WITHOUT IV CONTRAST



COMPLETED DATE/TME:  01/13/2020 21:39



CLINICAL HISTORY:

64 years Female, headache/htn increased



COMPARISON:

None.



TECHNIQUE:  No contrast.  Coronal and sagittal reformat.  This

exam was performed according to our departmental

dose-optimization program, which includes automated exposure

control, adjustment of the mA and/or kV according to patient size

and/or use of iterative reconstruction technique.



FINDINGS:  No hemorrhage or infarct. No mass, mass effect, or

midline shift. Brain and extra-axial structures appear intact.



IMPRESSION: Normal CT of the head.

## 2020-01-13 NOTE — RADIOLOGY REPORT (SQ)
EXAM DESCRIPTION:  CHEST 2 VIEWS



COMPLETED DATE/TIME:  1/13/2020 6:15 pm



REASON FOR STUDY:  left arm pain, jaw pain, elevated BP



COMPARISON:  3/25/2019.



EXAM PARAMETERS:  NUMBER OF VIEWS: two views

TECHNIQUE: Digital Frontal and Lateral radiographic views of the chest acquired.

RADIATION DOSE: NA

LIMITATIONS: none



FINDINGS:  LUNGS AND PLEURA: No opacities, masses or pneumothorax. No pleural effusion.

MEDIASTINUM AND HILAR STRUCTURES: No masses or contour abnormalities.

HEART AND VASCULAR STRUCTURES: Heart normal size.  No evidence for failure.

BONES: No acute findings.

HARDWARE: None in the chest.

OTHER: No other significant finding.



IMPRESSION:  NO ACUTE RADIOGRAPHIC FINDING IN THE CHEST.



TECHNICAL DOCUMENTATION:  JOB ID:  [id number]

 2011 [employer]- All Rights Reserved



Reading location - IP/workstation name: [email or web address]

## 2020-01-14 VITALS — DIASTOLIC BLOOD PRESSURE: 97 MMHG | SYSTOLIC BLOOD PRESSURE: 161 MMHG

## 2020-01-14 NOTE — EKG REPORT
SEVERITY:- BORDERLINE ECG -

SINUS RHYTHM

PROBABLE LEFT ATRIAL ABNORMALITY

BORDERLINE LEFT AXIS DEVIATION

BORDERLINE T ABNORMALITIES, INFERIOR LEADS

:

Confirmed by: Tiffany Ricketts MD 14-Jan-2020 11:19:41

## 2020-07-09 ENCOUNTER — HOSPITAL ENCOUNTER (OUTPATIENT)
Dept: HOSPITAL 62 - WI | Age: 65
End: 2020-07-09
Attending: FAMILY MEDICINE
Payer: MEDICARE

## 2020-07-09 DIAGNOSIS — R10.84: Primary | ICD-10-CM

## 2020-07-09 DIAGNOSIS — K76.0: ICD-10-CM

## 2020-07-09 PROCEDURE — 76700 US EXAM ABDOM COMPLETE: CPT

## 2020-07-09 NOTE — WOMENS IMAGING REPORT
EXAM DESCRIPTION:  U/S ABDOMEN TOTAL



IMAGES COMPLETED DATE/TIME:  7/9/2020 8:52 am



REASON FOR STUDY:  R10.84 GENERALIZED ABDOMINAL PAIN R10.84  GENERALIZED ABDOMINAL PAIN



COMPARISON:  6/27/2018



TECHNIQUE:  Dynamic and static grayscale images acquired of the abdomen and recorded on PACS. Additio
nal selected color Doppler and spectral images recorded.

Note:  Study does not meet criteria for complete doppler/duplex scan



LIMITATIONS:  None.



FINDINGS:  PANCREAS: No masses. Visualized pancreatic duct normal caliber.

LIVER: Fatty infiltration.  No focal mass.  No intrahepatic biliary dilatation.

LIVER VASCULATURE: Normal directional flow of the main portal vein and hepatic veins.

GALLBLADDER: Surgically absent.

ULTRASOUND-DETECTED SNYDER'S SIGN: Not applicable.

INTRAHEPATIC DUCTS AND COMMON DUCT: CBD and intrahepatic ducts normal caliber. No filling defects.

INFERIOR VENA CAVA: Not adequately visualized due to acoustic impedance.

AORTA: Not adequately visualized due to acoustic impedance.

RIGHT KIDNEY:  Normal size.   Normal echogenicity.   No solid or suspicious masses.   No hydronephros
is.   No calcifications.

LEFT KIDNEY:  Normal size.   Normal echogenicity.   No solid or suspicious masses.   No hydronephrosi
s.   No calcifications.

SPLEEN: Normal size. No solid masses.

PERITONEAL AND PLEURAL SPACES: No ascites or effusions.

OTHER: No other significant finding.



IMPRESSION:  Status post cholecystectomy.  Hepatic steatosis.  No acute findings.



TECHNICAL DOCUMENTATION:  JOB ID:  [id number]

 2011 [employer]- All Rights Reserved



Reading location - IP/workstation name: [email or web address]